# Patient Record
Sex: MALE | Race: WHITE | NOT HISPANIC OR LATINO | Employment: FULL TIME | URBAN - METROPOLITAN AREA
[De-identification: names, ages, dates, MRNs, and addresses within clinical notes are randomized per-mention and may not be internally consistent; named-entity substitution may affect disease eponyms.]

---

## 2019-11-14 ENCOUNTER — TELEPHONE (OUTPATIENT)
Dept: GASTROENTEROLOGY | Facility: HOSPITAL | Age: 44
End: 2019-11-14

## 2019-11-14 RX ORDER — POLYETHYLENE GLYCOL 3350 17 G
2 POWDER IN PACKET (EA) ORAL AS NEEDED
COMMUNITY

## 2019-11-15 ENCOUNTER — ANESTHESIA EVENT (OUTPATIENT)
Dept: GASTROENTEROLOGY | Facility: HOSPITAL | Age: 44
End: 2019-11-15

## 2019-11-15 ENCOUNTER — TELEPHONE (OUTPATIENT)
Dept: GASTROENTEROLOGY | Facility: HOSPITAL | Age: 44
End: 2019-11-15

## 2019-11-15 RX ORDER — SODIUM CHLORIDE 9 MG/ML
125 INJECTION, SOLUTION INTRAVENOUS CONTINUOUS
Status: CANCELLED | OUTPATIENT
Start: 2019-11-15

## 2019-11-18 ENCOUNTER — HOSPITAL ENCOUNTER (OUTPATIENT)
Dept: GASTROENTEROLOGY | Facility: HOSPITAL | Age: 44
Setting detail: OUTPATIENT SURGERY
Discharge: HOME | End: 2019-11-18
Attending: COLON & RECTAL SURGERY | Admitting: COLON & RECTAL SURGERY
Payer: COMMERCIAL

## 2019-11-18 ENCOUNTER — ANESTHESIA (OUTPATIENT)
Dept: GASTROENTEROLOGY | Facility: HOSPITAL | Age: 44
End: 2019-11-18

## 2019-11-18 VITALS
WEIGHT: 165 LBS | HEART RATE: 78 BPM | DIASTOLIC BLOOD PRESSURE: 85 MMHG | RESPIRATION RATE: 16 BRPM | HEIGHT: 69 IN | SYSTOLIC BLOOD PRESSURE: 132 MMHG | OXYGEN SATURATION: 99 % | TEMPERATURE: 97.1 F | BODY MASS INDEX: 24.44 KG/M2

## 2019-11-18 DIAGNOSIS — Z86.010 PERSONAL HISTORY OF COLONIC POLYPS: ICD-10-CM

## 2019-11-18 DIAGNOSIS — Z12.11 ENCOUNTER FOR SCREENING FOR MALIGNANT NEOPLASM OF COLON: ICD-10-CM

## 2019-11-18 PROCEDURE — 88305 TISSUE EXAM BY PATHOLOGIST: CPT | Performed by: PATHOLOGY

## 2019-11-18 PROCEDURE — 88341 IMHCHEM/IMCYTCHM EA ADD ANTB: CPT | Performed by: PATHOLOGY

## 2019-11-18 PROCEDURE — 88342 IMHCHEM/IMCYTCHM 1ST ANTB: CPT | Performed by: PATHOLOGY

## 2019-11-18 RX ORDER — PROPOFOL 10 MG/ML
INJECTION, EMULSION INTRAVENOUS AS NEEDED
Status: DISCONTINUED | OUTPATIENT
Start: 2019-11-18 | End: 2019-11-18 | Stop reason: SURG

## 2019-11-18 RX ORDER — SODIUM CHLORIDE 9 MG/ML
125 INJECTION, SOLUTION INTRAVENOUS CONTINUOUS
Status: DISCONTINUED | OUTPATIENT
Start: 2019-11-18 | End: 2019-11-22 | Stop reason: HOSPADM

## 2019-11-18 RX ADMIN — PROPOFOL 50 MG: 10 INJECTION, EMULSION INTRAVENOUS at 11:14

## 2019-11-18 RX ADMIN — PROPOFOL 20 MG: 10 INJECTION, EMULSION INTRAVENOUS at 11:10

## 2019-11-18 RX ADMIN — PROPOFOL 20 MG: 10 INJECTION, EMULSION INTRAVENOUS at 11:08

## 2019-11-18 RX ADMIN — SODIUM CHLORIDE 125 ML/HR: 0.9 INJECTION, SOLUTION INTRAVENOUS at 10:36

## 2019-11-18 RX ADMIN — PROPOFOL 50 MG: 10 INJECTION, EMULSION INTRAVENOUS at 11:12

## 2019-11-18 RX ADMIN — PROPOFOL 130 MG: 10 INJECTION, EMULSION INTRAVENOUS at 11:06

## 2019-11-18 RX ADMIN — PROPOFOL 50 MG: 10 INJECTION, EMULSION INTRAVENOUS at 11:17

## 2019-11-18 RX ADMIN — PROPOFOL 30 MG: 10 INJECTION, EMULSION INTRAVENOUS at 11:09

## 2019-11-18 RX ADMIN — LIDOCAINE HYDROCHLORIDE 50 MG: 20 INJECTION, SOLUTION INTRAVENOUS at 11:06

## 2019-11-18 RX ADMIN — PROPOFOL 50 MG: 10 INJECTION, EMULSION INTRAVENOUS at 11:16

## 2019-11-18 NOTE — INTERVAL H&P NOTE
H&P reviewed  After examining the patient I find no changes in the patients condition since the H&P had been written      Vitals:    11/18/19 1024   BP: 102/66   Pulse: 62   Resp: 16   Temp: (!) 97 1 °F (36 2 °C)   SpO2: 98%

## 2019-11-18 NOTE — ANESTHESIA PREPROCEDURE EVALUATION
Review of Systems/Medical History  Patient summary reviewed  Chart reviewed  No history of anesthetic complications     Cardiovascular  Negative cardio ROS    Pulmonary  Smoker cigarette smoker  ,        GI/Hepatic  Negative GI/hepatic ROS   Bowel prep  Comment: HCP     Negative  ROS        Endo/Other  Negative endo/other ROS      GYN  Negative gynecology ROS          Hematology  Negative hematology ROS      Musculoskeletal  Negative musculoskeletal ROS        Neurology  Negative neurology ROS      Psychology   Negative psychology ROS              Physical Exam    Airway    Mallampati score: II  TM Distance: >3 FB  Neck ROM: full     Dental   No notable dental hx     Cardiovascular  Comment: Negative ROS, Rhythm: regular, Rate: normal, Cardiovascular exam normal    Pulmonary  Pulmonary exam normal Breath sounds clear to auscultation,     Other Findings        Anesthesia Plan  ASA Score- 2     Anesthesia Type- IV sedation with anesthesia with ASA Monitors  Additional Monitors:   Airway Plan:         Plan Factors-Patient not instructed to abstain from smoking on day of procedure  Patient did not smoke on day of surgery  Induction- intravenous  Postoperative Plan-     Informed Consent- Anesthetic plan and risks discussed with patient  I personally reviewed this patient with the CRNA  Discussed and agreed on the Anesthesia Plan with the CRNA  Terrance Schroeder

## 2019-11-18 NOTE — DISCHARGE INSTRUCTIONS
[facility] [address], [facility]  [address]  Phone: [phone]    DISCHARGE INSTRUCTIONS:    1   __X_ Complete Exam FIBROUS BAND IN RECTUM REMOVED    2   ___ Exam normal, but entire colon not seen  We will discuss this with you  3   ___ Polyp(s) removed by "burning" - NO pathology report will follow    4   ___ Polyp(s) removed by excision  Pathology report will be available in 4-5 days   Someone from our office will call you with results  5   ___ Exam prompted biopsies  Pathology report will be available in 4-5 days   Someone from our office will call you with results  6   ___ Exam demonstrated findings that need treatment  Prescriptions will be   Given to you  Return to our office in ____ weeks  Please call for appt  7   ___ Original office visit or colonoscopy findings necessitate an office visit  Please call to set up a new appointment    8   ___ Medication  __________________________________________        [address]:    - Go straight home and rest today    - No driving or drinking alcohol for 24 hours    - Resume regular diet and medications unless otherwise instructed  Coumadin and Plavix are blood thinners  You can resume these medications on __________  IF YOU ARE HAVING ANY FEVER, BLEEDING OR PERSISTENT PAIN IN THE ABDOMEN, PLEASE CALL OUR OFFICE ANY DAY OR TIME  [phone]    Colonoscopy   WHAT YOU NEED TO KNOW:   A colonoscopy is a procedure to examine the inside of your colon (intestine) with a scope  Polyps or tissue growths may have been removed during your colonoscopy  It is normal to feel bloated and to have some abdominal discomfort  You should be passing gas  If you have hemorrhoids or you had polyps removed, you may have a small amount of bleeding  DISCHARGE INSTRUCTIONS:   Seek care immediately if:   · You have a large amount of bright red blood in your bowel movements      · Your abdomen is hard and firm and you have severe pain  · You have sudden trouble breathing  Contact your healthcare provider if:   · You develop a rash or hives  · You have a fever within 24 hours of your procedure  · You have not had a bowel movement for 3 days after your procedure  · You have questions or concerns about your condition or care  Activity:   · Do not lift, strain, or run  for 3 days after your procedure  · Rest after your procedure  You have been given medicine to relax you  Do not  drive or make important decisions until the day after your procedure  Return to your normal activity as directed  · Relieve gas and discomfort from bloating  by lying on your right side with a heating pad on your abdomen  You may need to take short walks to help the gas move out  Eat small meals until bloating is relieved  If you had polyps removed: For 7 days after your procedure:  · Do not  take aspirin  · Do not  go on long car rides  Help prevent constipation:   · Eat a variety of healthy foods  Healthy foods include fruit, vegetables, whole-grain breads, low-fat dairy products, beans, lean meat, and fish  Ask if you need to be on a special diet  Your healthcare provider may recommend that you eat high-fiber foods such as cooked beans  Fiber helps you have regular bowel movements  · Drink liquids as directed  Adults should drink between 9 and 13 eight-ounce cups of liquid every day  Ask what amount is best for you  For most people, good liquids to drink are water, juice, and milk  · Exercise as directed  Talk to your healthcare provider about the best exercise plan for you  Exercise can help prevent constipation, decrease your blood pressure and improve your health  Follow up with your healthcare provider as directed:  Write down your questions so you remember to ask them during your visits     © 2017 [employer] Information is for End User's use only and may not be sold, redistributed or otherwise used for commercial purposes  All illustrations and images included in CareNotes® are the copyrighted property of A D A M , Inc  or Ra Ruano  The above information is an  only  It is not intended as medical advice for individual conditions or treatments  Talk to your doctor, nurse or pharmacist before following any medical regimen to see if it is safe and effective for you

## 2020-12-19 ENCOUNTER — NURSE TRIAGE (OUTPATIENT)
Dept: OTHER | Facility: OTHER | Age: 45
End: 2020-12-19

## 2021-03-10 DIAGNOSIS — Z23 ENCOUNTER FOR IMMUNIZATION: ICD-10-CM

## 2023-11-20 ENCOUNTER — EVALUATION (OUTPATIENT)
Dept: PHYSICAL THERAPY | Facility: CLINIC | Age: 48
End: 2023-11-20
Payer: COMMERCIAL

## 2023-11-20 DIAGNOSIS — S46.012D TRAUMATIC COMPLETE TEAR OF LEFT ROTATOR CUFF, SUBSEQUENT ENCOUNTER: Primary | ICD-10-CM

## 2023-11-20 DIAGNOSIS — Z98.890 S/P LEFT ROTATOR CUFF REPAIR: ICD-10-CM

## 2023-11-20 DIAGNOSIS — S46.112D RUPTURE OF LEFT LONG HEAD BICEPS TENDON, SUBSEQUENT ENCOUNTER: ICD-10-CM

## 2023-11-20 PROCEDURE — 97161 PT EVAL LOW COMPLEX 20 MIN: CPT | Performed by: PHYSICAL THERAPIST

## 2023-11-20 NOTE — LETTER
2023    Nate Cartwright MD  [address] [place] [address]    Patient: Chip Mays   YOB: 1975   Date of Visit: 2023     Encounter Diagnosis     ICD-10-CM    1. Traumatic complete tear of left rotator cuff, subsequent encounter  S46.012D       2. S/P left rotator cuff repair  Z98.890       3. Rupture of left long head biceps tendon, subsequent encounter  S46.112D           Dear Dr. Roberto White:    Thank you for your recent referral of Chip Mays. Please review the attached evaluation summary from Tanya's recent visit. Please verify that you agree with the plan of care by signing the attached order. If you have any questions or concerns, please do not hesitate to call. I sincerely appreciate the opportunity to share in the care of one of your patients and hope to have another opportunity to work with you in the near future. Sincerely,    Gerber Munoz, PT      Referring Provider:      I certify that I have read the below Plan of Care and certify the need for these services furnished under this plan of treatment while under my care. Nate Cartwright MD  [id number]  Via Fax: [phone]            PT Evaluation     Today's date: 2023  Patient name: Chip Mays  : 1975  MRN: [id number]  Referring provider: Nate Cartwright*  Dx:   Encounter Diagnosis     ICD-10-CM    1. Traumatic complete tear of left rotator cuff, subsequent encounter  S46.012D       2. S/P left rotator cuff repair  Z98.890       3. Rupture of left long head biceps tendon, subsequent encounter  S46.112D                      Assessment  Assessment details: Patient is a 46 y.o. male who  presents with pain, range of motion loss, weakness s/p (L)  rotator cuff repair, biceps tenodesis,  distal clavicle excision and subacromial decompression 23.   He has functional limitations as a result of impairments. Patient would benefit from course of skilled physical therapy to address above listed impairments in an effort to improve function. Understanding of Dx/Px/POC: excellent  Goals  ST : Decrease L shoulder pain to 2/10 at worst x 1 continuous week within 6 weeks         PROM: Improve L shoulder PROM to 120 degrees for flexion/scaption, ER to 45 degrees, IR to 45 degrees within 6 weeks. AROM Improve L shoulder AROM flexion to 90 degrees in 6-8 weeks. Pt to begin submaximal shoulder isometrics by 6 weeks post op. Improved FOTO self rated functional scale score (25@ IE), pt to note greater ease with dressing, return to driving within 6 weeks. LT : Eliminate L shoulder pain x 1 continuous week within 12 weeks. PROM: Improve L shoulder PROM to 170 degrees for flexion/scaption, ER to 90 degrees, IR to 75degrees within 12 weeks. AROM :  Improve L shoulder AROM to 170 degrees for flexion/scaption, ER to 90 degrees, IR to T7 level within 16 weeks. Strength: 5/5 L shoulder stength within 16 weeks -20 weeks. Function: FOTO score to be at least 67 within 16 weeks. Independent with home exercise program within 16-20 weeks. Plan  Patient would benefit from: skilled PT  Planned modality interventions: cryotherapy, TENS, thermotherapy: hydrocollator packs and ultrasound  Planned therapy interventions: ADL training, body mechanics training, functional ROM exercises, home exercise program, joint mobilization, manual therapy, neuromuscular re-education, patient education, postural training, strengthening, stretching, therapeutic activities and therapeutic exercise  Frequency: 2-3 x's per week x 16 weeks.   Treatment plan discussed with: patient      Subjective Evaluation    History of Present Illness  Mechanism of injury: Patient is a 46 y.o. old male who presents for an initial outpatient physical therapy consultation regarding his L shoulder Darylene Pipe [facility] significant for (R) shoulder rotator cuff repair about 6 years ago. Around August or 2023 noticed some L shoulder pain putting brakes on car, then a few weeks later actuallly felt a pop in shoulder trying to get filter canister loose for water filtration system. Subsequent MRI (+) for rotator cuff tear. Underwent a L shoulder arthroscopic rotator cuff repair, long head biceps tenodesis, distal calvicle excision, subacomrial eompression on 23. Current c/o pain, inability to use L arm for ADLs, inability to work, inability to drive. 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 Mild shoulder edema    L shoulder PROM: Patient is able to achieve pendulum position  PROM FF: 90 degrees  PROM scaption: 90 degrees  PROM ER @ 45 abd 20 degrees  PROM IR: chest    AROM/Strength testing of L  shoulder NP secondary to recency of surgery. L Elbow PROM: 0-140 degrees    L Wrist AROM:WFL    UE sensation intact to light touch. Precautions: Follow rotator cuff protocol attached to chart.       Manuals 23            PROM L shoulder (as tolerated) RG                                                   Neuro Re-Ed                                                                                                        Ther Ex             Pendulums 10cw/ccw x 3            Self PROM L elbow flexion x30            Shrugs with scap squeeze X 20 Ther Activity                                       Gait Training                                       Modalities             Cryo L shoulder 10 min            IE/HEP

## 2023-11-20 NOTE — LETTER
2023      No Recipients    Patient: Shani Parikh   YOB: 1975   Date of Visit: 2023     Encounter Diagnosis     ICD-10-CM    1. Traumatic complete tear of left rotator cuff, subsequent encounter  S46.012D       2. S/P left rotator cuff repair  Z98.890       3. Rupture of left long head biceps tendon, subsequent encounter  S46.112D           Dear Dr. Odessa Rivera:    Thank you for your recent referral of Shani Parikh. Please review the attached evaluation summary from Tanya's recent visit. Please verify that you agree with the plan of care by signing the attached order. If you have any questions or concerns, please do not hesitate to call. I sincerely appreciate the opportunity to share in the care of one of your patients and hope to have another opportunity to work with you in the near future. Sincerely,    Shanice Anthony, PT      Referring Provider:      I certify that I have read the below Plan of Care and certify the need for these services furnished under this plan of treatment while under my care. Kasey Angel MD  [id number]  Via Fax: [phone]            PT Evaluation     Today's date: 2023  Patient name: Shani Parikh  : 1975  MRN: [id number]  Referring provider: Kasey Angel*  Dx:   Encounter Diagnosis     ICD-10-CM    1. Traumatic complete tear of left rotator cuff, subsequent encounter  S46.012D       2. S/P left rotator cuff repair  Z98.890       3. Rupture of left long head biceps tendon, subsequent encounter  S46.112D                      Assessment  Assessment details: Patient is a 46 y.o. male who  presents with pain, range of motion loss, weakness s/p (L)  rotator cuff repair, biceps tenodesis,  distal clavicle excision and subacromial decompression 23. He has functional limitations as a result of impairments.  Patient would benefit from course of skilled physical therapy to address above listed impairments in an effort to improve function. Understanding of Dx/Px/POC: excellent  Goals  ST : Decrease L shoulder pain to 2/10 at worst x 1 continuous week within 6 weeks         PROM: Improve L shoulder PROM to 120 degrees for flexion/scaption, ER to 45 degrees, IR to 45 degrees within 6 weeks. AROM Improve L shoulder AROM flexion to 90 degrees in 6-8 weeks. Pt to begin submaximal shoulder isometrics by 6 weeks post op. Improved FOTO self rated functional scale score (25@ IE), pt to note greater ease with dressing, return to driving within 6 weeks. LT : Eliminate L shoulder pain x 1 continuous week within 12 weeks. PROM: Improve L shoulder PROM to 170 degrees for flexion/scaption, ER to 90 degrees, IR to 75degrees within 12 weeks. AROM :  Improve L shoulder AROM to 170 degrees for flexion/scaption, ER to 90 degrees, IR to T7 level within 16 weeks. Strength: 5/5 L shoulder stength within 16 weeks -20 weeks. Function: FOTO score to be at least 67 within 16 weeks. Independent with home exercise program within 16-20 weeks. Plan  Patient would benefit from: skilled PT  Planned modality interventions: cryotherapy, TENS, thermotherapy: hydrocollator packs and ultrasound  Planned therapy interventions: ADL training, body mechanics training, functional ROM exercises, home exercise program, joint mobilization, manual therapy, neuromuscular re-education, patient education, postural training, strengthening, stretching, therapeutic activities and therapeutic exercise  Frequency: 2-3 x's per week x 16 weeks. Treatment plan discussed with: patient      Subjective Evaluation    History of Present Illness  Mechanism of injury: Patient is a 46 y.o. old male who presents for an initial outpatient physical therapy consultation regarding his L shoulder   .   [facility] significant for (R) shoulder rotator cuff repair about 6 years ago. Around August or 2023 noticed some L shoulder pain putting brakes on car, then a few weeks later actuallly felt a pop in shoulder trying to get filter canister loose for water filtration system. Subsequent MRI (+) for rotator cuff tear. Underwent a L shoulder arthroscopic rotator cuff repair, long head biceps tenodesis, distal calvicle excision, subacomrial eompression on 23. Current c/o pain, inability to use L arm for ADLs, inability to work, inability to drive. 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 Mild shoulder edema    L shoulder PROM: Patient is able to achieve pendulum position  PROM FF: 90 degrees  PROM scaption: 90 degrees  PROM ER @ 45 abd 20 degrees  PROM IR: chest    AROM/Strength testing of L  shoulder NP secondary to recency of surgery. L Elbow PROM: 0-140 degrees    L Wrist AROM:WFL    UE sensation intact to light touch. Precautions: Follow rotator cuff protocol attached to chart.       Manuals                                                                 Neuro Re-Ed                                                                                                        Ther Ex                                                                                                                     Ther Activity                                       Gait Training                                       Modalities {There is no content from the last Daily Treatment Diary section. }

## 2023-11-20 NOTE — PROGRESS NOTES
PT Evaluation     Today's date: 2023  Patient name: Ish Padilla  : 1975  MRN: [id number]  Referring provider: Keyonna Miller*  Dx:   Encounter Diagnosis     ICD-10-CM    1. Traumatic complete tear of left rotator cuff, subsequent encounter  S46.012D       2. S/P left rotator cuff repair  Z98.890       3. Rupture of left long head biceps tendon, subsequent encounter  S46.112D                      Assessment  Assessment details: Patient is a 46 y.o. male who  presents with pain, range of motion loss, weakness s/p (L)  rotator cuff repair, biceps tenodesis,  distal clavicle excision and subacromial decompression 23. He has functional limitations as a result of impairments. Patient would benefit from course of skilled physical therapy to address above listed impairments in an effort to improve function. Understanding of Dx/Px/POC: excellent  Goals  ST : Decrease L shoulder pain to 2/10 at worst x 1 continuous week within 6 weeks         PROM: Improve L shoulder PROM to 120 degrees for flexion/scaption, ER to 45 degrees, IR to 45 degrees within 6 weeks. AROM Improve L shoulder AROM flexion to 90 degrees in 6-8 weeks. Pt to begin submaximal shoulder isometrics by 6 weeks post op. Improved FOTO self rated functional scale score (25@ IE), pt to note greater ease with dressing, return to driving within 6 weeks. LT : Eliminate L shoulder pain x 1 continuous week within 12 weeks. PROM: Improve L shoulder PROM to 170 degrees for flexion/scaption, ER to 90 degrees, IR to 75degrees within 12 weeks. AROM :  Improve L shoulder AROM to 170 degrees for flexion/scaption, ER to 90 degrees, IR to T7 level within 16 weeks. Strength: 5/5 L shoulder stength within 16 weeks -20 weeks. Function: FOTO score to be at least 67 within 16 weeks. Independent with home exercise program within 16-20 weeks.       Plan  Patient would benefit from: skilled PT  Planned modality interventions: cryotherapy, TENS, thermotherapy: hydrocollator packs and ultrasound  Planned therapy interventions: ADL training, body mechanics training, functional ROM exercises, home exercise program, joint mobilization, manual therapy, neuromuscular re-education, patient education, postural training, strengthening, stretching, therapeutic activities and therapeutic exercise  Frequency: 2-3 x's per week x 16 weeks. Treatment plan discussed with: patient      Subjective Evaluation    History of Present Illness  Mechanism of injury: Patient is a 46 y.o. old male who presents for an initial outpatient physical therapy consultation regarding his L shoulder   . PM[facility] significant for (R) shoulder rotator cuff repair about 6 years ago. Around August or 2023 noticed some L shoulder pain putting brakes on car, then a few weeks later actuallly felt a pop in shoulder trying to get filter canister loose for water filtration system. Subsequent MRI (+) for rotator cuff tear. Underwent a L shoulder arthroscopic rotator cuff repair, long head biceps tenodesis, distal calvicle excision, subacomrial eompression on 23. Current c/o pain, inability to use L arm for ADLs, inability to work, inability to drive. 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    Mild shoulder edema    L shoulder PROM: Patient is able to achieve pendulum position  PROM FF: 90 degrees  PROM scaption: 90 degrees  PROM ER @ 45 abd 20 degrees  PROM IR: chest    AROM/Strength testing of L  shoulder NP secondary to recency of surgery. L Elbow PROM: 0-140 degrees    L Wrist AROM:WFL    UE sensation intact to light touch. Precautions: Follow rotator cuff protocol attached to chart.       Manuals 11/20/23            PROM L shoulder (as tolerated) RG                                                   Neuro Re-Ed                                                                                                        Ther Ex             Pendulums 10cw/ccw x 3            Self PROM L elbow flexion x30            Shrugs with scap squeeze X 20                                                                             Ther Activity                                       Gait Training                                       Modalities             Cryo L shoulder 10 min            IE/HEP

## 2023-11-24 ENCOUNTER — OFFICE VISIT (OUTPATIENT)
Dept: PHYSICAL THERAPY | Facility: CLINIC | Age: 48
End: 2023-11-24
Payer: COMMERCIAL

## 2023-11-24 DIAGNOSIS — S46.012D TRAUMATIC COMPLETE TEAR OF LEFT ROTATOR CUFF, SUBSEQUENT ENCOUNTER: Primary | ICD-10-CM

## 2023-11-24 DIAGNOSIS — Z98.890 S/P LEFT ROTATOR CUFF REPAIR: ICD-10-CM

## 2023-11-24 DIAGNOSIS — S46.112D RUPTURE OF LEFT LONG HEAD BICEPS TENDON, SUBSEQUENT ENCOUNTER: ICD-10-CM

## 2023-11-24 PROCEDURE — 97110 THERAPEUTIC EXERCISES: CPT

## 2023-11-24 NOTE — PROGRESS NOTES
Daily Note     Today's date: 2023  Patient name: Mariama Martinez  : 1975  MRN: [id number]  Referring provider: Cordell Holt*  Dx:   Encounter Diagnosis     ICD-10-CM    1. Traumatic complete tear of left rotator cuff, subsequent encounter  S46.012D       2. S/P left rotator cuff repair  Z98.890       3. Rupture of left long head biceps tendon, subsequent encounter  S46.112D           Start Time: 09  Stop Time: 1000  Total time in clinic (min): 40 minutes    Subjective: Pt reports he has been doing well. Sleeping has been the toughest part of his recovery so far, and trying to find a comfortable position. Has been compliant with performance of HEP and utilizing primarily ice to manage any pain/soreness. Recently saw his orthopedic surgeon who was pleased with his progress thus far. Objective: See treatment diary below      Assessment: Tolerated treatment well. Initiated POC as outlined below focusing primarily on mobility and ROM of R shldr.  Most restricted and most discomfort noted during PROM into ER. Patient would benefit from continued PT to further improve strength, increase ROM, and maximize overall function, per MD protocol. Plan: Continue per plan of care. Monitor response to initial treatment NV. Precautions: Follow rotator cuff protocol attached to chart.       Manuals 23           PROM L shoulder (as tolerated) RG [employer]                                                  Neuro Re-Ed                                                                                                        Ther Ex             Pendulums 10cw/ccw x 3 10cw/ccw x 3           Self PROM L elbow flexion x30 x30           Shrugs with scap squeeze X 20 x20                                                                            Ther Activity                                       Gait Training                                       Modalities             Cryo L shoulder 10 min Deferred to home           IE/HEP

## 2023-11-28 ENCOUNTER — OFFICE VISIT (OUTPATIENT)
Dept: PHYSICAL THERAPY | Facility: CLINIC | Age: 48
End: 2023-11-28
Payer: COMMERCIAL

## 2023-11-28 DIAGNOSIS — S46.012D TRAUMATIC COMPLETE TEAR OF LEFT ROTATOR CUFF, SUBSEQUENT ENCOUNTER: Primary | ICD-10-CM

## 2023-11-28 DIAGNOSIS — S46.112D RUPTURE OF LEFT LONG HEAD BICEPS TENDON, SUBSEQUENT ENCOUNTER: ICD-10-CM

## 2023-11-28 DIAGNOSIS — Z98.890 S/P LEFT ROTATOR CUFF REPAIR: ICD-10-CM

## 2023-11-28 PROCEDURE — 97140 MANUAL THERAPY 1/> REGIONS: CPT | Performed by: PHYSICAL THERAPIST

## 2023-11-28 NOTE — PROGRESS NOTES
Daily Note     Today's date: 2023  Patient name: Tony Quevedo  : 1975  MRN: [id number]  Referring provider: Stephie Watt*  Dx:   Encounter Diagnosis     ICD-10-CM    1. Traumatic complete tear of left rotator cuff, subsequent encounter  S46.012D       2. Rupture of left long head biceps tendon, subsequent encounter  S46.112D       3. S/P left rotator cuff repair  Z98.890                      Subjective: Has dull anoying pain throughout the thsay about 3/10 on average. Sleeping in recliner with other pillows supporting him. Stll is still not good, getting afew hours at a time. No use of pain medication. Objective: See treatment diary below  PROM L shoulder  FF: 100 degrees with pain  Scaption:110 degrees with pain  ER: 40 degrees with pain  IR: 35 degrees with pain    Assessment:  Brice Bah is now 1 week, 1 day post op. PROM coming along appropriately. Plan: Continue per plan of care. Precautions: Follow rotator cuff protocol attached to chart.       Manuals 23          PROM L shoulder (as tolerated) RG AFB RG                                                 Neuro Re-Ed                                                                                                        Ther Ex             Pendulums 10cw/ccw x 3 10cw/ccw x 3 10cw/ccw x 3          Self PROM L elbow flexion x30 x30 X 30          Shrugs with scap squeeze X 20 x20 X 30                                                                           Ther Activity                                       Gait Training                                       Modalities             Cryo L shoulder 10 min Deferred to home 10 min          IE/HEP

## 2023-11-30 ENCOUNTER — OFFICE VISIT (OUTPATIENT)
Dept: PHYSICAL THERAPY | Facility: CLINIC | Age: 48
End: 2023-11-30
Payer: COMMERCIAL

## 2023-11-30 DIAGNOSIS — Z98.890 S/P LEFT ROTATOR CUFF REPAIR: ICD-10-CM

## 2023-11-30 DIAGNOSIS — S46.012D TRAUMATIC COMPLETE TEAR OF LEFT ROTATOR CUFF, SUBSEQUENT ENCOUNTER: Primary | ICD-10-CM

## 2023-11-30 PROCEDURE — 97140 MANUAL THERAPY 1/> REGIONS: CPT | Performed by: PHYSICAL THERAPIST

## 2023-11-30 NOTE — PROGRESS NOTES
Daily Note     Today's date: 2023  Patient name: Jhon Alvarado  : 1975  MRN: [id number]  Referring provider: Shannan Baumgarten*  Dx:   Encounter Diagnosis     ICD-10-CM    1. Traumatic complete tear of left rotator cuff, subsequent encounter  S46.012D       2. S/P left rotator cuff repair  Z98.890                      Subjective: Waking up around 4 am consistently. Sling uncomfortable. No use of Rx of OTC pain medication for the past 48 hours. HEP and donning/doffing sling easier. Objective: See treatment diary below  PROM L shoulder  FF: 120 degrees with pain  Scaption:140 degrees with pain  ER: 50 degrees with pain  IR: 45 degrees with pain    Assessment: PROM coming along well/appropriately now two weeks, 3 days postop. Patient would benefit from continued course of skilled physical therapy to address impairments in an effort to improve function. Plan: Continue per plan of care. Precautions: Follow rotator cuff protocol attached to chart.       Manuals 23         PROM L shoulder (as tolerated) RG AFB RG RG                                                Neuro Re-Ed                                                                                                        Ther Ex             Pendulums 10cw/ccw x 3 10cw/ccw x 3 10cw/ccw x 3 10cw/ccw x 3         Self PROM L elbow flexion x30 x30 X 30 X 30         Shrugs with scap squeeze X 20 x20 X 30 X 30                                                                          Ther Activity                                       Gait Training                                       Modalities             Cryo L shoulder 10 min Deferred to home 10 min 10 min         IE/HEP

## 2023-12-05 ENCOUNTER — OFFICE VISIT (OUTPATIENT)
Dept: PHYSICAL THERAPY | Facility: CLINIC | Age: 48
End: 2023-12-05
Payer: COMMERCIAL

## 2023-12-05 DIAGNOSIS — S46.012D TRAUMATIC COMPLETE TEAR OF LEFT ROTATOR CUFF, SUBSEQUENT ENCOUNTER: Primary | ICD-10-CM

## 2023-12-05 DIAGNOSIS — Z98.890 S/P LEFT ROTATOR CUFF REPAIR: ICD-10-CM

## 2023-12-05 PROCEDURE — 97140 MANUAL THERAPY 1/> REGIONS: CPT | Performed by: PHYSICAL THERAPIST

## 2023-12-05 NOTE — PROGRESS NOTES
Daily Note     Today's date: 2023  Patient name: Cordell Gilman  : 1975  MRN: [id number]  Referring provider: Emilio Rothman*  Dx:   Encounter Diagnosis     ICD-10-CM    1. Traumatic complete tear of left rotator cuff, subsequent encounter  S46.012D       2. S/P left rotator cuff repair  Z98.890                        Subjective: A little better each day. Compliant with sling use. Objective: See treatment diary below  PROM L shoulder  FF: 115 degrees with pain, some crepittaion  Scaption:120 degrees with pain  ER: 50 degrees with pain  IR: 50 degrees with pain    Assessment:  3 weeks, 1 day post up. No acute concerns. PROM still coming along appropriately. He is in a holding pattern with exercise until 4 weeks post op radha. Patient would benefit from continued course of skilled physical therapy to address impairments in an effort to improve function. Plan: Continue per plan of care. Precautions: Follow rotator cuff protocol attached to chart.       Manuals 23        PROM L shoulder (as tolerated) RG AFB RG RG RG                                               Neuro Re-Ed                                                                                                        Ther Ex             Pendulums 10cw/ccw x 3 10cw/ccw x 3 10cw/ccw x 3 10cw/ccw x 3 0cw/ccw x 3        Self PROM L elbow flexion x30 x30 X 30 X 30 X 30        Shrugs with scap squeeze X 20 x20 X 30 X 30 X 30                                                                         Ther Activity                                       Gait Training                                       Modalities             Cryo L shoulder 10 min Deferred to home 10 min 10 min 10 min        IE/HEP
10-Dec-2018 05:04

## 2023-12-08 ENCOUNTER — OFFICE VISIT (OUTPATIENT)
Dept: PHYSICAL THERAPY | Facility: CLINIC | Age: 48
End: 2023-12-08
Payer: COMMERCIAL

## 2023-12-08 DIAGNOSIS — S46.112D RUPTURE OF LEFT LONG HEAD BICEPS TENDON, SUBSEQUENT ENCOUNTER: ICD-10-CM

## 2023-12-08 DIAGNOSIS — Z98.890 S/P LEFT ROTATOR CUFF REPAIR: ICD-10-CM

## 2023-12-08 DIAGNOSIS — S46.012D TRAUMATIC COMPLETE TEAR OF LEFT ROTATOR CUFF, SUBSEQUENT ENCOUNTER: Primary | ICD-10-CM

## 2023-12-08 PROCEDURE — 97140 MANUAL THERAPY 1/> REGIONS: CPT

## 2023-12-08 NOTE — PROGRESS NOTES
Daily Note     Today's date: 2023  Patient name: Duncan Will  : 1975  MRN: [id number]  Referring provider: Jessica Mattson*  Dx:   Encounter Diagnosis     ICD-10-CM    1. Traumatic complete tear of left rotator cuff, subsequent encounter  S46.012D       2. S/P left rotator cuff repair  Z98.890       3. Rupture of left long head biceps tendon, subsequent encounter  S46.112D                        Subjective: Patient is now sleeping better. Compliant with his precautions and use of sling. Objective: See treatment diary below. Assessment:  3 weeks, 4 days post-op. Unable to progress further at this point given timeframe. Improving ROM despite pain. Will continue to follow post-op protocol and progress when appropriate. Plan: Continue per plan of care. Precautions: Follow rotator cuff protocol attached to chart.     DOS: 2023    Manuals 23       PROM L shoulder   (as tolerated) RG AFB RG RG RG RG & EH                                              Neuro Re-Ed                                                                                                        Ther Ex             Pendulums 10cw/ccw x 3 10cw/ccw x 3 10cw/ccw x 3 10cw/ccw x 3 0cw/ccw x 3 3x10  ea       Self PROM L elbow flexion x30 x30 X 30 X 30 X 30 x30       Shrugs with scap squeeze X 20 x20 X 30 X 30 X 30 x30                                                                        Ther Activity                                       Gait Training                                       Modalities             Cryo L shoulder 10 min Deferred to home 10 min 10 min 10 min 10 min post       IE/HEP

## 2023-12-12 ENCOUNTER — OFFICE VISIT (OUTPATIENT)
Dept: PHYSICAL THERAPY | Facility: CLINIC | Age: 48
End: 2023-12-12
Payer: COMMERCIAL

## 2023-12-12 DIAGNOSIS — Z98.890 S/P LEFT ROTATOR CUFF REPAIR: ICD-10-CM

## 2023-12-12 DIAGNOSIS — S46.012D TRAUMATIC COMPLETE TEAR OF LEFT ROTATOR CUFF, SUBSEQUENT ENCOUNTER: Primary | ICD-10-CM

## 2023-12-12 DIAGNOSIS — S46.112D RUPTURE OF LEFT LONG HEAD BICEPS TENDON, SUBSEQUENT ENCOUNTER: ICD-10-CM

## 2023-12-12 PROCEDURE — 97140 MANUAL THERAPY 1/> REGIONS: CPT | Performed by: PHYSICAL THERAPIST

## 2023-12-12 PROCEDURE — 97110 THERAPEUTIC EXERCISES: CPT | Performed by: PHYSICAL THERAPIST

## 2023-12-12 NOTE — PROGRESS NOTES
Daily Note     Today's date: 2023  Patient name: Romelia Ramirez  : 1975  MRN: [id number]  Referring provider: Oliva Santos*  Dx:   Encounter Diagnosis     ICD-10-CM    1. Traumatic complete tear of left rotator cuff, subsequent encounter  S46.012D       2. S/P left rotator cuff repair  Z98.890       3. Rupture of left long head biceps tendon, subsequent encounter  S46.112D                      Subjective: Feeling stiff and sore today, has not done HEP today. Objective: See treatment diary below. Progressed into AAROM activities now at 4 week post op radha. PROM scaption improved to 145 degrees      Assessment: Making appropriate progress now 4 weeks post op. Patient would benefit from continued course of skilled physical therapy to address impairments in an effort to improve function. Plan: Continue per plan of care. Sling until 6 weeks post op (23)     Precautions: Follow rotator cuff protocol attached to chart.     DOS: 2023    Manuals 23      PROM L shoulder   (as tolerated) RG AFB RG RG RG RG & EH RG                                             Neuro Re-Ed                                                                                                        Ther Ex             Pendulums 10cw/ccw x 3 10cw/ccw x 3 10cw/ccw x 3 10cw/ccw x 3 0cw/ccw x 3 3x10  ea 10cw/ccw x 3      Self PROM L elbow flexion x30 x30 X 30 X 30 X 30 x30 X 30      Shrugs with scap squeeze X 20 x20 X 30 X 30 X 30 x30 X 30      Table slides FF (both arms on towel)       10sec x 20      Arm on table, lean forward for self PROM ER       10sec x 10      Self supine AAROM flexion       10sec x 10                                Ther Activity                                       Gait Training                                       Modalities             Cryo L shoulder 10 min Deferred to home 10 min 10 min 10 min 10 min post 10 min      IE/HEP

## 2023-12-15 ENCOUNTER — OFFICE VISIT (OUTPATIENT)
Dept: PHYSICAL THERAPY | Facility: CLINIC | Age: 48
End: 2023-12-15
Payer: COMMERCIAL

## 2023-12-15 DIAGNOSIS — S46.112D RUPTURE OF LEFT LONG HEAD BICEPS TENDON, SUBSEQUENT ENCOUNTER: ICD-10-CM

## 2023-12-15 DIAGNOSIS — S46.012D TRAUMATIC COMPLETE TEAR OF LEFT ROTATOR CUFF, SUBSEQUENT ENCOUNTER: Primary | ICD-10-CM

## 2023-12-15 DIAGNOSIS — Z98.890 S/P LEFT ROTATOR CUFF REPAIR: ICD-10-CM

## 2023-12-15 PROCEDURE — 97110 THERAPEUTIC EXERCISES: CPT | Performed by: PHYSICAL THERAPIST

## 2023-12-15 PROCEDURE — 97140 MANUAL THERAPY 1/> REGIONS: CPT | Performed by: PHYSICAL THERAPIST

## 2023-12-15 NOTE — PROGRESS NOTES
Daily Note     Today's date: 12/15/2023  Patient name: Jesus Bello  : 1975  MRN: [id number]  Referring provider: Cammie Rodriguez  Dx:   Encounter Diagnosis     ICD-10-CM    1. Traumatic complete tear of left rotator cuff, subsequent encounter  S46.012D       2. S/P left rotator cuff repair  Z98.890       3. Rupture of left long head biceps tendon, subsequent encounter  S46.112D                        Subjective: Doing HEP. Feels some pain with progressions. Current notes tightness vs pain. Objective: See treatment diary below. PROM L  shoulder:  FF: 150 degrees  Scaption:155 degrees  ER: 55 degrees  IR:50 degrees    Assessment: Best objective day yet as far as ROM concerned. Patient would benefit from continued course of skilled physical therapy to address impairments in an effort to improve function. Plan: Continue per plan of care. Sling until 6 weeks post op (23)     Precautions: Follow rotator cuff protocol attached to chart.     DOS: 2023    Manuals 11/20/23 11/24/23 11/28 11/30 12/5 12/8 12/12 12/15     PROM L shoulder   (as tolerated) RG AFB RG RG RG RG & EH RG RG                                            Neuro Re-Ed                                                                                                        Ther Ex             Pendulums 10cw/ccw x 3 10cw/ccw x 3 10cw/ccw x 3 10cw/ccw x 3 0cw/ccw x 3 3x10  ea 10cw/ccw x 3 10cw/ccw x 3     Self PROM L elbow flexion x30 x30 X 30 X 30 X 30 x30 X 30  X 30     Shrugs with scap squeeze X 20 x20 X 30 X 30 X 30 x30 X 30 X 20     Table slides FF (both arms on towel)       10sec x 20 10sec x 20     Arm on table, lean forward for self PROM ER       10sec x 10 10sec x 10     Self supine AAROM flexion       10sec x 10 10sec x 10                               Ther Activity                                       Gait Training                                       Modalities             Cryo L shoulder 10 min Deferred to home 10 min 10 min 10 min 10 min post 10 min 10 min     IE/HEP

## 2023-12-19 ENCOUNTER — OFFICE VISIT (OUTPATIENT)
Dept: PHYSICAL THERAPY | Facility: CLINIC | Age: 48
End: 2023-12-19
Payer: COMMERCIAL

## 2023-12-19 DIAGNOSIS — Z98.890 S/P LEFT ROTATOR CUFF REPAIR: ICD-10-CM

## 2023-12-19 DIAGNOSIS — S46.112D RUPTURE OF LEFT LONG HEAD BICEPS TENDON, SUBSEQUENT ENCOUNTER: ICD-10-CM

## 2023-12-19 DIAGNOSIS — S46.012D TRAUMATIC COMPLETE TEAR OF LEFT ROTATOR CUFF, SUBSEQUENT ENCOUNTER: Primary | ICD-10-CM

## 2023-12-19 PROCEDURE — 97110 THERAPEUTIC EXERCISES: CPT | Performed by: PHYSICAL THERAPIST

## 2023-12-19 PROCEDURE — 97140 MANUAL THERAPY 1/> REGIONS: CPT | Performed by: PHYSICAL THERAPIST

## 2023-12-19 NOTE — PROGRESS NOTES
Daily Note     Today's date: 2023  Patient name: Tanya Padgett  : 1975  MRN: [id number]  Referring provider: Bogdan Neville*  Dx:   Encounter Diagnosis     ICD-10-CM    1. Traumatic complete tear of left rotator cuff, subsequent encounter  S46.012D       2. S/P left rotator cuff repair  Z98.890       3. Rupture of left long head biceps tendon, subsequent encounter  S46.112D                      Subjective: Less pain, home exercises getting easier, he is sleeping better.      Objective: See treatment diary below.    PROM L  shoulder:  FF: 150 degrees  Scaption:160 degrees  ER: 60 degrees  IR:50 degrees      Assessment: Tanya is now 5weeks, 1 day postop. Capsular tightness is improving.      Plan: Continue per plan of care. Sling to be discharged at 6 weeks postop (23)     Precautions: Follow rotator cuff protocol attached to chart.    DOS: 2023    Manuals 11/20/23 11/24/23 11/28 11/30 12/5 12/8 12/12 12/15 12/19    PROM L shoulder   (as tolerated) RG AFB RG RG RG RG & EH RG RG RG                                           Neuro Re-Ed                                                                                                        Ther Ex             Pendulums 10cw/ccw x 3 10cw/ccw x 3 10cw/ccw x 3 10cw/ccw x 3 0cw/ccw x 3 3x10  ea 10cw/ccw x 3 10cw/ccw x 3 10cw/ccw x 3    Self PROM L elbow flexion x30 x30 X 30 X 30 X 30 x30 X 30  X 30 D/C    Shrugs with scap squeeze X 20 x20 X 30 X 30 X 30 x30 X 30 X 20 X 30    Table slides FF (both arms on towel)       10sec x 20 10sec x 20 10sec x 20    Arm on table, lean forward for self PROM ER       10sec x 10 10sec x 10 10sec x 10    Self supine AAROM flexion       10sec x 10 10sec x 10 10sec x 10    Supine wand AAROM L shoulder ER with towel roll under arm at 90 ABD         10sec x 10                 Ther Activity                                       Gait Training                                       Modalities             Cryo L  shoulder 10 min Deferred to home 10 min 10 min 10 min 10 min post 10 min 10 min Pt deferred    IE/HEP

## 2023-12-22 ENCOUNTER — OFFICE VISIT (OUTPATIENT)
Dept: PHYSICAL THERAPY | Facility: CLINIC | Age: 48
End: 2023-12-22
Payer: COMMERCIAL

## 2023-12-22 DIAGNOSIS — Z98.890 S/P LEFT ROTATOR CUFF REPAIR: ICD-10-CM

## 2023-12-22 DIAGNOSIS — S46.012D TRAUMATIC COMPLETE TEAR OF LEFT ROTATOR CUFF, SUBSEQUENT ENCOUNTER: Primary | ICD-10-CM

## 2023-12-22 DIAGNOSIS — S46.112D RUPTURE OF LEFT LONG HEAD BICEPS TENDON, SUBSEQUENT ENCOUNTER: ICD-10-CM

## 2023-12-22 PROCEDURE — 97140 MANUAL THERAPY 1/> REGIONS: CPT | Performed by: PHYSICAL THERAPIST

## 2023-12-22 PROCEDURE — 97110 THERAPEUTIC EXERCISES: CPT | Performed by: PHYSICAL THERAPIST

## 2023-12-22 NOTE — PROGRESS NOTES
Daily Note     Today's date: 2023  Patient name: Tanya Padgett  : 1975  MRN: [id number]  Referring provider: Bogdan Neville*  Dx:   Encounter Diagnosis     ICD-10-CM    1. Traumatic complete tear of left rotator cuff, subsequent encounter  S46.012D       2. S/P left rotator cuff repair  Z98.890       3. Rupture of left long head biceps tendon, subsequent encounter  S46.112D                      Subjective: patient offers no new complaints, he states he is sleeping better. He states that he is excited to be able to take the sling off on [place].      Objective: See treatment diary below    Assessment: Tanya is now 5 weeks, 4 day postop. He has good tolerance to PROM. He demonstrates good technique throughout the session with minimal cues required. He would benefit from continued PT to improve range of motion, strength, and functional abilities.       Plan: Continue per plan of care.  Progress treatment as tolerated.   Progress treament per protocol.      Precautions: Follow rotator cuff protocol attached to chart.    DOS: 2023    Manuals 11/20/23 11/24/23 11/28 11/30 12/5 12/8 12/12 12/15 12/19 12/22   PROM L shoulder   (as tolerated) RG AFB RG RG RG RG & EH RG RG RG SK                                          Neuro Re-Ed                                                                                                        Ther Ex             Pendulums 10cw/ccw x 3 10cw/ccw x 3 10cw/ccw x 3 10cw/ccw x 3 0cw/ccw x 3 3x10  ea 10cw/ccw x 3 10cw/ccw x 3 10cw/ccw x 3 10 cw/ccw x3   Self PROM L elbow flexion x30 x30 X 30 X 30 X 30 x30 X 30  X 30 D/C -   Shrugs with scap squeeze X 20 x20 X 30 X 30 X 30 x30 X 30 X 20 X 30 X 30   Table slides FF (both arms on towel)       10sec x 20 10sec x 20 10sec x 20 10sec x 20   Arm on table, lean forward for self PROM ER       10sec x 10 10sec x 10 10sec x 10 10sec x 20   Self supine AAROM flexion       10sec x 10 10sec x 10 10sec x 10 10sec x 20    Supine wand AAROM L shoulder ER with towel roll under arm at 90 ABD         10sec x 10 10sec x 10                Ther Activity                                       Gait Training                                       Modalities             Cryo L shoulder 10 min Deferred to home 10 min 10 min 10 min 10 min post 10 min 10 min Pt deferred 10' post   IE/HEP

## 2023-12-26 ENCOUNTER — OFFICE VISIT (OUTPATIENT)
Dept: PHYSICAL THERAPY | Facility: CLINIC | Age: 48
End: 2023-12-26
Payer: COMMERCIAL

## 2023-12-26 DIAGNOSIS — Z98.890 S/P LEFT ROTATOR CUFF REPAIR: ICD-10-CM

## 2023-12-26 DIAGNOSIS — S46.012D TRAUMATIC COMPLETE TEAR OF LEFT ROTATOR CUFF, SUBSEQUENT ENCOUNTER: Primary | ICD-10-CM

## 2023-12-26 DIAGNOSIS — S46.112D RUPTURE OF LEFT LONG HEAD BICEPS TENDON, SUBSEQUENT ENCOUNTER: ICD-10-CM

## 2023-12-26 PROCEDURE — 97110 THERAPEUTIC EXERCISES: CPT | Performed by: PHYSICAL THERAPIST

## 2023-12-26 PROCEDURE — 97140 MANUAL THERAPY 1/> REGIONS: CPT | Performed by: PHYSICAL THERAPIST

## 2023-12-26 NOTE — PROGRESS NOTES
Daily Note     Today's date: 2023  Patient name: Tanay Padgett  : 1975  MRN: [id number]  Referring provider: Bogdan Neville*  Dx:   Encounter Diagnosis     ICD-10-CM    1. Traumatic complete tear of left rotator cuff, subsequent encounter  S46.012D       2. S/P left rotator cuff repair  Z98.890       3. Rupture of left long head biceps tendon, subsequent encounter  S46.112D                        Subjective: Out of sling as of yesterday. Returned to driving today. Feels better to be out of sling, however still with soreness and stiffness in L shoulder.        Objective: See treatment diary below.  Progressed self stretching program.  Progressed periscapular strengthening program.    AROM L shoulder FF: 60 degrees      Assessment: Tanya is now 6 weeks, 1 day postop. Fatigued with progressions. Has the most tightness with rotational motions with capsular restriction noted. He would benefit from continued PT to improve range of motion, strength, and functional abilities.       Plan: Continue per plan of care.  Progress treament per protocol.      Precautions: Follow rotator cuff protocol attached to chart.    DOS: 2023    Manuals 23            PROM L shoulder   (as tolerated) RG            Manual posterior capsule stretch RG                                      Neuro Re-Ed                                                                                                        Ther Ex             UBE UBE2  2/2  L1            Pulley flexion 10sec hold x 5 min            Finger ladder 10sec x 5                         Arm on table, lean forward for self PROM ER 10sec x 10            Supine wand AAROM L shoulder ER with towel roll under arm at 90 ABD 10sec x 10            Sleeper IR/ER 30sec x 3             Tband Rows Green  3sec  2x  10            Ther Activity                                       Gait Training                                       Modalities             Cryo L  shoulder 10 min            IE/HEP

## 2023-12-29 ENCOUNTER — OFFICE VISIT (OUTPATIENT)
Dept: PHYSICAL THERAPY | Facility: CLINIC | Age: 48
End: 2023-12-29
Payer: COMMERCIAL

## 2023-12-29 DIAGNOSIS — S46.112D RUPTURE OF LEFT LONG HEAD BICEPS TENDON, SUBSEQUENT ENCOUNTER: ICD-10-CM

## 2023-12-29 DIAGNOSIS — S46.012D TRAUMATIC COMPLETE TEAR OF LEFT ROTATOR CUFF, SUBSEQUENT ENCOUNTER: Primary | ICD-10-CM

## 2023-12-29 DIAGNOSIS — Z98.890 S/P LEFT ROTATOR CUFF REPAIR: ICD-10-CM

## 2023-12-29 PROCEDURE — 97140 MANUAL THERAPY 1/> REGIONS: CPT | Performed by: PHYSICAL THERAPIST

## 2023-12-29 PROCEDURE — 97110 THERAPEUTIC EXERCISES: CPT | Performed by: PHYSICAL THERAPIST

## 2023-12-29 NOTE — PROGRESS NOTES
Daily Note     Today's date: 2023  Patient name: Tanya Padgett  : 1975  MRN: [id number]  Referring provider: Bogdan Neville*  Dx:   Encounter Diagnosis     ICD-10-CM    1. Traumatic complete tear of left rotator cuff, subsequent encounter  S46.012D       2. Rupture of left long head biceps tendon, subsequent encounter  S46.112D       3. S/P left rotator cuff repair  Z98.890                          Subjective: Has been doing HEP. Still with difficulty lifting L arm, having some sleep interruption because of pain.        Objective: See treatment diary below.     AROM L shoulder FF: 75 degrees      PROM L  shoulder:  FF: 155 degrees  Scaption:165 degrees  ER: 65 degrees  IR:55 degrees      Assessment: Making steady progress towards recovery.    Plan: Continue per plan of care.  Progress treament per protocol.      Precautions: Follow rotator cuff protocol attached to chart.    DOS: 2023    Manuals 23           PROM L shoulder   (as tolerated) RG RG           Manual posterior capsule stretch RG RG                                     Neuro Re-Ed                                                                                                        Ther Ex             UBE UBE2  2/2  L1 UBE2  3/3  L1           Pulley flexion 10sec hold x 5 min 10sec hold x 5 min           Finger ladder 10sec x 5 10sec x 5                        Arm on table, lean forward for self PROM ER 10sec x 10 10sec x 10           Supine wand AAROM L shoulder ER with towel roll under arm at 90 ABD 10sec x 10 10sec x 10           Sleeper IR/ER 30sec x 3             Tband Rows Green  3sec  2x  10 Green  3sec  2 x 10           Tband Extension  Green  3sec  2 x 10           Ther Activity                                       Gait Training                                       Modalities             Cryo L shoulder 10 min 10 min           IE/HEP

## 2024-01-02 ENCOUNTER — OFFICE VISIT (OUTPATIENT)
Dept: PHYSICAL THERAPY | Facility: CLINIC | Age: 49
End: 2024-01-02
Payer: COMMERCIAL

## 2024-01-02 DIAGNOSIS — Z98.890 S/P LEFT ROTATOR CUFF REPAIR: ICD-10-CM

## 2024-01-02 DIAGNOSIS — S46.012D TRAUMATIC COMPLETE TEAR OF LEFT ROTATOR CUFF, SUBSEQUENT ENCOUNTER: Primary | ICD-10-CM

## 2024-01-02 PROCEDURE — 97140 MANUAL THERAPY 1/> REGIONS: CPT | Performed by: PHYSICAL THERAPIST

## 2024-01-02 PROCEDURE — 97110 THERAPEUTIC EXERCISES: CPT | Performed by: PHYSICAL THERAPIST

## 2024-01-02 NOTE — PROGRESS NOTES
Progress  Note     Today's date: 2024  Patient name: Tanya Padgett  : 1975  MRN: [id number]  Referring provider: Bogdan Neville*  Dx:   Encounter Diagnosis     ICD-10-CM    1. Traumatic complete tear of left rotator cuff, subsequent encounter  S46.012D       2. S/P left rotator cuff repair  Z98.890                  Assessment  Assessment details: Patient is a 48 y.o. male who  presents with pain, range of motion loss, weakness s/p (L)  rotator cuff repair, biceps tenodesis,  distal clavicle excision and subacromial decompression 23.  Making progress 7 weeks, 1 day post op. Has moderate capsular tightness. Next phase of rehab starting work on AAROM then AROM and gentle strengthening in an effort to restore post operative function.      FOTO self rated functional score improved to 53 from 25@ IE.       Understanding of Dx/Px/POC: excellent  Goals  ST : Decrease L shoulder pain to 2/10 at worst x 1 continuous week within 6 weeks-not yet met         PROM: Improve L shoulder PROM to 120 degrees for flexion/scaption, ER to 45 degrees, IR to 45 degrees within 6 weeks.-met         AROM Improve L shoulder AROM flexion to 90 degrees in 6-8 weeks.-not yet met         Pt to begin submaximal shoulder isometrics by 6 weeks post op.-met         Improved FOTO self rated functional scale score (25@ IE), pt to note greater ease with dressing, return to driving within 6 weeks.-met     LT : Eliminate L shoulder pain x 1 continuous week within 12 weeks.-not yet met         PROM: Improve L shoulder PROM to 170 degrees for flexion/scaption, ER to 90 degrees, IR to 75degrees within 12 weeks.-not yet met         AROM :  Improve L shoulder AROM to 170 degrees for flexion/scaption, ER to 90 degrees, IR to T7 level within 16 weeks. -not yet met         Strength: 5/5 L shoulder stength within 16 weeks -20 weeks.-not yet met         Function: FOTO score to be at least 67 within 16 weeks.-not yet met          Independent with home exercise program within 16-20 weeks.-not yet met        Plan  Patient would benefit from: skilled PT  Planned modality interventions: cryotherapy, TENS, thermotherapy: hydrocollator packs and ultrasound  Planned therapy interventions: ADL training, body mechanics training, functional ROM exercises, home exercise program, joint mobilization, manual therapy, neuromuscular re-education, patient education, postural training, strengthening, stretching, therapeutic activities and therapeutic exercise  Frequency: 2-3 x's per week x 10 weeks.  Treatment plan discussed with: patient             Subjective: Feeling stronger.   Gets up to 3-4/10 pain randomly.   Sleep improving.   No use of pain medicaton.   Has been dressing and bathing independently.   Has returned to driving.   Stretching into external rotation specifically aggravating.  Remains out of work.  Is to follow up with Dr. Neville tomorrow.      Objective: See treatment diary below    Shoulder Comments   L shoulder:  Arthroscopic surgical incision sites healed     Sling has been discharged      AROM L shoulder FF: 80 degrees       PROM L  shoulder:  FF: 155 degrees  Scaption:165 degrees  ER: 60 degrees with end range capsular tightness and pain.  IR:55 degrees     AROM/Strength testing of L  shoulder NP secondary to recency of surgery.     L Elbow PROM: 0-145 degrees     L Wrist AROM:WFL     UE sensation intact to light touch.     Precautions: Follow rotator cuff protocol attached to chart.    DOS: 11/13/2023    Manuals 12/26/23 12/29/23 1/2/24          PROM L shoulder   (as tolerated) RG RG RG          Manual posterior capsule stretch RG RG NP (anterior pain)                                    Neuro Re-Ed                                                                                                        Ther Ex             UBE UBE2  2/2  L1 UBE2  3/3  L1 UBE2  3/3  L1.5          Pulley flexion 10sec hold x 5 min 10sec hold x 5 min  10sec hold x 5 min          Finger ladder 10sec x 5 10sec x 5 10sec x 5                       Arm on table, lean forward for self PROM ER 10sec x 10 10sec x 10 10sec x 10          Supine wand AAROM L shoulder ER with towel roll under arm at 90 ABD 10sec x 10 10sec x 10 10sec x 10          Sleeper IR/ER 30sec x 3  30sec x 3           Tband Rows Green  3sec  2x  10 Green  3sec  2 x 10 Green  3sec  2 x 15          Tband Extension  Green  3sec  2 x 10 Green  3sec  2 x 15          Supine AROM flexion   X 5          PT assist Flexion   2 x 10          Ther Activity                                       Gait Training                                       Modalities             Cryo L shoulder 10 min 10 min           IE/HEP

## 2024-01-02 NOTE — LETTER
2024    Bogdan Neville MD  [address] [facility] [address]    Patient: Tanya Padgett   YOB: 1975   Date of Visit: 2024     Encounter Diagnosis     ICD-10-CM    1. Traumatic complete tear of left rotator cuff, subsequent encounter  S46.012D       2. S/P left rotator cuff repair  Z98.890           Dear Dr. Neville:    Thank you for your recent referral of Tanya Padgett. Please review the attached evaluation summary from Tanya's recent visit.     Please verify that you agree with the plan of care by signing the attached order.     If you have any questions or concerns, please do not hesitate to call.     I sincerely appreciate the opportunity to share in the care of one of your patients and hope to have another opportunity to work with you in the near future.       Sincerely,    iNgel Ely, PT      Referring Provider:      I certify that I have read the below Plan of Care and certify the need for these services furnished under this plan of treatment while under my care.                    Bogdan Neville MD  [address] [place] [address]  Via Fax: [phone]          Progress  Note     Today's date: 2024  Patient name: Tanya Padgett  : 1975  MRN: [id number]  Referring provider: Bogdan Neville*  Dx:   Encounter Diagnosis     ICD-10-CM    1. Traumatic complete tear of left rotator cuff, subsequent encounter  S46.012D       2. S/P left rotator cuff repair  Z98.890                  Assessment  Assessment details: Patient is a 48 y.o. male who  presents with pain, range of motion loss, weakness s/p (L)  rotator cuff repair, biceps tenodesis,  distal clavicle excision and subacromial decompression 23.  Making progress 7 weeks, 1 day post op. Has moderate capsular tightness. Next phase of rehab starting work on AAROM then AROM and gentle strengthening in an effort to restore post operative function.      FOTO self rated  functional score improved to 53 from 25@ IE.       Understanding of Dx/Px/POC: excellent  Goals  ST : Decrease L shoulder pain to 2/10 at worst x 1 continuous week within 6 weeks-not yet met         PROM: Improve L shoulder PROM to 120 degrees for flexion/scaption, ER to 45 degrees, IR to 45 degrees within 6 weeks.-met         AROM Improve L shoulder AROM flexion to 90 degrees in 6-8 weeks.-not yet met         Pt to begin submaximal shoulder isometrics by 6 weeks post op.-met         Improved FOTO self rated functional scale score (25@ IE), pt to note greater ease with dressing, return to driving within 6 weeks.-met     LT : Eliminate L shoulder pain x 1 continuous week within 12 weeks.-not yet met         PROM: Improve L shoulder PROM to 170 degrees for flexion/scaption, ER to 90 degrees, IR to 75degrees within 12 weeks.-not yet met         AROM :  Improve L shoulder AROM to 170 degrees for flexion/scaption, ER to 90 degrees, IR to T7 level within 16 weeks. -not yet met         Strength: 5/5 L shoulder stength within 16 weeks -20 weeks.-not yet met         Function: FOTO score to be at least 67 within 16 weeks.-not yet met         Independent with home exercise program within 16-20 weeks.-not yet met        Plan  Patient would benefit from: skilled PT  Planned modality interventions: cryotherapy, TENS, thermotherapy: hydrocollator packs and ultrasound  Planned therapy interventions: ADL training, body mechanics training, functional ROM exercises, home exercise program, joint mobilization, manual therapy, neuromuscular re-education, patient education, postural training, strengthening, stretching, therapeutic activities and therapeutic exercise  Frequency: 2-3 x's per week x 10 weeks.  Treatment plan discussed with: patient             Subjective: Feeling stronger.   Gets up to 3-4/10 pain randomly.   Sleep improving.   No use of pain medicaton.   Has been dressing and bathing independently.   Has returned to  driving.   Stretching into external rotation specifically aggravating.  Remains out of work.  Is to follow up with Dr. Neville tomorrow.      Objective: See treatment diary below    Shoulder Comments   L shoulder:  Arthroscopic surgical incision sites healed     Sling has been discharged      AROM L shoulder FF: 80 degrees       PROM L  shoulder:  FF: 155 degrees  Scaption:165 degrees  ER: 60 degrees with end range capsular tightness and pain.  IR:55 degrees     AROM/Strength testing of L  shoulder NP secondary to recency of surgery.     L Elbow PROM: 0-145 degrees     L Wrist AROM:WFL     UE sensation intact to light touch.     Precautions: Follow rotator cuff protocol attached to chart.    DOS: 11/13/2023    Manuals 12/26/23 12/29/23 1/2/24          PROM L shoulder   (as tolerated) RG RG RG          Manual posterior capsule stretch RG RG NP (anterior pain)                                    Neuro Re-Ed                                                                                                        Ther Ex             UBE UBE2  2/2  L1 UBE2  3/3  L1 UBE2  3/3  L1.5          Pulley flexion 10sec hold x 5 min 10sec hold x 5 min 10sec hold x 5 min          Finger ladder 10sec x 5 10sec x 5 10sec x 5                       Arm on table, lean forward for self PROM ER 10sec x 10 10sec x 10 10sec x 10          Supine wand AAROM L shoulder ER with towel roll under arm at 90 ABD 10sec x 10 10sec x 10 10sec x 10          Sleeper IR/ER 30sec x 3  30sec x 3           Tband Rows Green  3sec  2x  10 Green  3sec  2 x 10 Green  3sec  2 x 15          Tband Extension  Green  3sec  2 x 10 Green  3sec  2 x 15          Supine AROM flexion   X 5          PT assist Flexion   2 x 10          Ther Activity                                       Gait Training                                       Modalities             Cryo L shoulder 10 min 10 min           IE/HEP

## 2024-01-05 ENCOUNTER — OFFICE VISIT (OUTPATIENT)
Dept: PHYSICAL THERAPY | Facility: CLINIC | Age: 49
End: 2024-01-05
Payer: COMMERCIAL

## 2024-01-05 DIAGNOSIS — S46.012D TRAUMATIC COMPLETE TEAR OF LEFT ROTATOR CUFF, SUBSEQUENT ENCOUNTER: Primary | ICD-10-CM

## 2024-01-05 DIAGNOSIS — Z98.890 S/P LEFT ROTATOR CUFF REPAIR: ICD-10-CM

## 2024-01-05 PROCEDURE — 97140 MANUAL THERAPY 1/> REGIONS: CPT | Performed by: PHYSICAL THERAPIST

## 2024-01-05 PROCEDURE — 97110 THERAPEUTIC EXERCISES: CPT | Performed by: PHYSICAL THERAPIST

## 2024-01-05 NOTE — PROGRESS NOTES
Daily Note     Today's date: 2024  Patient name: Tanya Padgett  : 1975  MRN: [id number]  Referring provider: Bogdan Neville*  Dx:   Encounter Diagnosis     ICD-10-CM    1. Traumatic complete tear of left rotator cuff, subsequent encounter  S46.012D       2. S/P left rotator cuff repair  Z98.890           Subjective: Patient tells me they are feeling a little better each week. Able to raise arm with greater ease.          Objective:  Rx as per treatment diary below  AROM L shoulder elavation in scaption improved to 130 degrees.      Assessment: Tolerated treatment well                          Functional limitations remain as follows:     -Unable to work     -Decreased lifting capacity     -Sleep interruption associated with pain  Patient would benefit from continued course of skilled physical therapy to address impairments in an effort to improve function.      Plan: Continue as per plan of care.              Precautions: Follow rotator cuff protocol attached to chart.    DOS: 2023    Manuals 23         PROM L shoulder   (as tolerated) RG RG RG RG         Manual posterior capsule stretch RG RG NP (anterior pain)                                    Neuro Re-Ed                                                                                                        Ther Ex             UBE UBE2  2/2  L1 UBE2  3/3  L1 UBE2  3/3  L1.5 UBE2  3/3  1.5         Pulley flexion 10sec hold x 5 min 10sec hold x 5 min 10sec hold x 5 min 10sec hold x 5 min         Finger ladder 10sec x 5 10sec x 5 10sec x 5 X 5                      Arm on table, lean forward for self PROM ER 10sec x 10 10sec x 10 10sec x 10          Supine wand AAROM L shoulder ER with towel roll under arm at 90 ABD 10sec x 10 10sec x 10 10sec x 10          Sleeper IR/ER 30sec x 3  30sec x 3 30sec x 3          Tband Rows Green  3sec  2x  10 Green  3sec  2 x 10 Green  3sec  2 x 15 Green  2 x 15         Tband  Extension  Green  3sec  2 x 10 Green  3sec  2 x 15 Green  2 x 15         Supine AROM flexion   X 5 X 10         PT assist Flexion   2 x 10 No assixt  10         SL ER    0#  2 x 10         Wand AA IR    10sec x 10         Ther Activity                                       Gait Training                                       Modalities             Cryo L shoulder 10 min 10 min           IE/HEP

## 2024-01-09 ENCOUNTER — OFFICE VISIT (OUTPATIENT)
Dept: PHYSICAL THERAPY | Facility: CLINIC | Age: 49
End: 2024-01-09
Payer: COMMERCIAL

## 2024-01-09 DIAGNOSIS — S46.012D TRAUMATIC COMPLETE TEAR OF LEFT ROTATOR CUFF, SUBSEQUENT ENCOUNTER: Primary | ICD-10-CM

## 2024-01-09 DIAGNOSIS — Z98.890 S/P LEFT ROTATOR CUFF REPAIR: ICD-10-CM

## 2024-01-09 DIAGNOSIS — S46.112D RUPTURE OF LEFT LONG HEAD BICEPS TENDON, SUBSEQUENT ENCOUNTER: ICD-10-CM

## 2024-01-09 PROCEDURE — 97140 MANUAL THERAPY 1/> REGIONS: CPT

## 2024-01-09 PROCEDURE — 97110 THERAPEUTIC EXERCISES: CPT

## 2024-01-09 NOTE — PROGRESS NOTES
"Daily Note     Today's date: 2024  Patient name: Tanya Padgett  : 1975  MRN: [id number]  Referring provider: Bogdan Neville*  Dx:   Encounter Diagnosis     ICD-10-CM    1. Traumatic complete tear of left rotator cuff, subsequent encounter  S46.012D       2. S/P left rotator cuff repair  Z98.890       3. Rupture of left long head biceps tendon, subsequent encounter  S46.112D               Subjective: Patient reported having some sleep disruptions associated with shoulder pain. Has difficulty with reaching behind back on L. Eager to return to work on Monday.      Objective: Rx as per treatment diary below.      Assessment: 8 weeks, 1 day post-op. Still with tissue tightness and capsular restrictions limiting his ROM. Continued to follow current post-op protocol and is making steady progress at this time. Will continue to progress as able to further improve shoulder ROM OH and address strength deficits.      Plan: Continue as per plan of care.        Precautions: Follow rotator cuff protocol attached to chart.    DOS: 2023    Manuals 23        PROM L shoulder   (as tolerated) RG RG RG RG EH        Manual posterior capsule stretch RG RG NP (anterior pain)                                    Neuro Re-Ed                                                                                                        Ther Ex             UBE UBE2  2/2  L1 UBE2  3/3  L1 UBE2  3/3  L1.5 UBE2  3/3  1.5 UBE 1  3 min ea  L2        Pulley flexion 10sec hold x 5 min 10sec hold x 5 min 10sec hold x 5 min 10sec hold x 5 min 10\" hold,  5 min        Finger ladder 10sec x 5 10sec x 5 10sec x 5 X 5 10\"x5                     Arm on table, lean forward for self PROM ER 10sec x 10 10sec x 10 10sec x 10          Supine wand AAROM L shoulder ER with towel roll under arm at 90 ABD 10sec x 10 10sec x 10 10sec x 10          Sleeper IR/ER 30sec x 3  30sec x 3 30sec x 3 30\"x3  ea        Tband Rows " "Green  3sec  2x  10 Green  3sec  2 x 10 Green  3sec  2 x 15 Green  2 x 15  GTB   2x15        Tband Extension  Green  3sec  2 x 10 Green  3sec  2 x 15 Green  2 x 15  GTB   2x15        Supine AROM flexion   X 5 X 10  x10        PT assist Flexion   2 x 10 No assixt  10  No assist   x10        SL ER    0#  2 x 10  2x10        Wand AA IR    10sec x 10  10\"x   10                     Ther Activity                                       Gait Training                                       Modalities             Cryo L shoulder 10 min 10 min           IE/HEP                  "

## 2024-01-12 ENCOUNTER — OFFICE VISIT (OUTPATIENT)
Dept: PHYSICAL THERAPY | Facility: CLINIC | Age: 49
End: 2024-01-12
Payer: COMMERCIAL

## 2024-01-12 DIAGNOSIS — S46.112D RUPTURE OF LEFT LONG HEAD BICEPS TENDON, SUBSEQUENT ENCOUNTER: ICD-10-CM

## 2024-01-12 DIAGNOSIS — S46.012D TRAUMATIC COMPLETE TEAR OF LEFT ROTATOR CUFF, SUBSEQUENT ENCOUNTER: Primary | ICD-10-CM

## 2024-01-12 DIAGNOSIS — Z98.890 S/P LEFT ROTATOR CUFF REPAIR: ICD-10-CM

## 2024-01-12 PROCEDURE — 97110 THERAPEUTIC EXERCISES: CPT | Performed by: PHYSICAL THERAPIST

## 2024-01-12 PROCEDURE — 97140 MANUAL THERAPY 1/> REGIONS: CPT | Performed by: PHYSICAL THERAPIST

## 2024-01-12 NOTE — PROGRESS NOTES
"Daily Note     Today's date: 2024  Patient name: Tanya Padgett  : 1975  MRN: [id number]  Referring provider: Bogdan Neville*  Dx:   Encounter Diagnosis     ICD-10-CM    1. Traumatic complete tear of left rotator cuff, subsequent encounter  S46.012D       2. S/P left rotator cuff repair  Z98.890       3. Rupture of left long head biceps tendon, subsequent encounter  S46.112D                 Subjective: Sleeping better since last visit. Will RTW on Monday 1/15/24.    Objective: Rx as per treatment diary below.    Reyes CRT assisted me with supervision/therex instruction during today's visit.    Assessment: Functional ROM and strength continue to improve. Patient would benefit from continued course of skilled physical therapy to address impairments in an effort to improve function.        Plan: Continue as per plan of care. Monitor response of shoulder to  RTW next week.        Precautions: Follow rotator cuff protocol attached to chart.    DOS: 2023    Manuals 23       PROM L shoulder   (as tolerated) RG RG RG RG EH RG       Manual posterior capsule stretch RG RG NP (anterior pain)   RG                                 Neuro Re-Ed                                                                                                        Ther Ex             UBE UBE2  2/2  L1 UBE2  3/3  L1 UBE2  3/3  L1.5 UBE2  3/3  1.5 UBE 1  3 min ea  L2 UBE2  3/3  L3       Pulley flexion 10sec hold x 5 min 10sec hold x 5 min 10sec hold x 5 min 10sec hold x 5 min 10\" hold,  5 min 10sec hold x 5min       Finger ladder 10sec x 5 10sec x 5 10sec x 5 X 5 10\"x5 10sec x 5                    Arm on table, lean forward for self PROM ER 10sec x 10 10sec x 10 10sec x 10          Supine wand AAROM L shoulder ER with towel roll under arm at 90 ABD 10sec x 10 10sec x 10 10sec x 10          Sleeper IR/ER 30sec x 3  30sec x 3 30sec x 3 30\"x3  ea 30sec x 3       Tband Rows " "Green  3sec  2x  10 Green  3sec  2 x 10 Green  3sec  2 x 15 Green  2 x 15  GTB   2x15 Green  2 x 15       Tband Extension  Green  3sec  2 x 10 Green  3sec  2 x 15 Green  2 x 15  GTB   2x15 Green  2 x 15       Supine AROM flexion   X 5 X 10  x10 2 x 10       PT assist Flexion   2 x 10 No assixt  10  No assist   x10 No asssit  2 x 10(scaption)       SL ER    0#  2 x 10  2x10 0#  2 x 10       Wand AA IR    10sec x 10  10\"x   10 10sec x 10       Tband IR      Green  2 x 15       Ther Activity                                       Gait Training                                       Modalities             Cryo L shoulder 10 min 10 min           IE/HEP                  "

## 2024-01-15 ENCOUNTER — OFFICE VISIT (OUTPATIENT)
Dept: PHYSICAL THERAPY | Facility: CLINIC | Age: 49
End: 2024-01-15
Payer: COMMERCIAL

## 2024-01-15 DIAGNOSIS — Z98.890 S/P LEFT ROTATOR CUFF REPAIR: ICD-10-CM

## 2024-01-15 DIAGNOSIS — S46.012D TRAUMATIC COMPLETE TEAR OF LEFT ROTATOR CUFF, SUBSEQUENT ENCOUNTER: Primary | ICD-10-CM

## 2024-01-15 PROCEDURE — 97140 MANUAL THERAPY 1/> REGIONS: CPT | Performed by: PHYSICAL THERAPIST

## 2024-01-15 PROCEDURE — 97110 THERAPEUTIC EXERCISES: CPT | Performed by: PHYSICAL THERAPIST

## 2024-01-15 NOTE — PROGRESS NOTES
"Daily Note     Today's date: 1/15/2024  Patient name: Tanya Padgett  : 1975  MRN: [id number]  Referring provider: Bogdan Neville*  Dx:   Encounter Diagnosis     ICD-10-CM    1. Traumatic complete tear of left rotator cuff, subsequent encounter  S46.012D       2. S/P left rotator cuff repair  Z98.890                   Subjective: Strained L shoulder yesterday AM. He was laying in bed, was startled by his dog and cat and pushed up hard using L arm against bed. Increased pain since that time. He does not think he retore his repair.  Returned to work today, did not have to use L arm much.    Objective: Rx as per treatment diary below.  Has 4-/5 L shoulder flexion strength, still able to elevate L arm without shrug/compensation  Drop Arm (-) on L      Assessment: Suffered a L shoulder strain since last visit. Exam today does not point to any new internal derangement. Still tolerated session well. Encouraged continued use of L UE for ADLs, continued HEP so he does not get tight.        Plan: Continue as per plan of care.   Precautions: Follow rotator cuff protocol attached to chart.    DOS: 2023    Manuals 12/26/23 12/29/23 1/2/24 1/5/24 1/9 1/12 1/15      PROM L shoulder   (as tolerated) RG RG RG RG EH RG RG      Manual posterior capsule stretch RG RG NP (anterior pain)   RG RG                                Neuro Re-Ed                                                                                                        Ther Ex             UBE UBE2  2/2  L1 UBE2  3/3  L1 UBE2  3/3  L1.5 UBE2  3/3  1.5 UBE 1  3 min ea  L2 UBE2  3/3  L3 UBE@  L2.5      Pulley flexion 10sec hold x 5 min 10sec hold x 5 min 10sec hold x 5 min 10sec hold x 5 min 10\" hold,  5 min 10sec hold x 5min 10sec hold x 4.5 min      Finger ladder 10sec x 5 10sec x 5 10sec x 5 X 5 10\"x5 10sec x 5 10sec x 5                   Arm on table, lean forward for self PROM ER 10sec x 10 10sec x 10 10sec x 10          Supine wand HUMBERTOOM L " 16.15 Received the pt from main ED From RN Rozina   for the management of Chest pain  . Pt is here for cardiac eval and stress test  tomorrow    pt  is alert and responsive, oriented x4, denies any respiratory distress,  CP SOB, or difficulty breathing.  Pt is oriented to the unit    comfort care & safety measures initiated  IV in place, patent and free of signs of infiltration, V/S  Pt is Tachycardiac 105/mt  [place] aware of the same , pt afebrile, pt denies pain at this time. , instructed patient to notify RN of any needed assistance, Pt verbalizes understanding, Call bell placed within reach. Safety maintained. Will continue to monitor [id number]  Report given to Floor  Pt is on  heparin Drip Next PTT is 22.15 Hrs.  Pt denies any CP Now  has stable vitals pt stable to go to floor "shoulder ER with towel roll under arm at 90 ABD 10sec x 10 10sec x 10 10sec x 10          Sleeper IR/ER 30sec x 3  30sec x 3 30sec x 3 30\"x3  ea 30sec x 3 30sec x 3      Tband Rows Green  3sec  2x  10 Green  3sec  2 x 10 Green  3sec  2 x 15 Green  2 x 15  GTB   2x15 Green  2 x 15 Green  2 x 15      Tband Extension  Green  3sec  2 x 10 Green  3sec  2 x 15 Green  2 x 15  GTB   2x15 Green  2 x 15 Green  2  z15      Supine AROM flexion   X 5 X 10  x10 2 x 10 2 x 10      PT assist Flexion   2 x 10 No assixt  10  No assist   x10 No asssit  2 x 10(scaption) 2 x10 (no shrug)      SL ER    0#  2 x 10  2x10 0#  2 x 10 0#  2 x 15      Wand AA IR    10sec x 10  10\"x   10 10sec x 10 10sec x 10      Tband IR      Green  2 x 15 Green  2 x 15      Ther Activity                                       Gait Training                                       Modalities             Cryo L shoulder 10 min 10 min     10 min      IE/HEP                  " Received pt from ИРИНА Montalvo, received pt alert and responsive, oriented x4, denies any respiratory distress, SOB, or difficulty breathing. Pt transferred to CDU for chest pain. Pt is currently chest pain free but c/o "feeling nervous and weak" and c/o 4/10 headache, pt offered pain medication but is currently declining. Pt aware to notify staff should pain increase/ change.  Pt currently denies SOB, diaphoresis, dizziness, lightheadedness, N/V, F/C. On telemetry pt is SR HR 90's to Sinus tachy low 100's. Pending stress test in Am pt is aware not to consume caffeine prior to test, verbalized understanding. , IV in place, patent and free of signs of infiltration, , V/S stable, pt afebrile,  Pt educated on unit and unit rules, instructed patient to notify RN of any needed assistance, Pt verbalizes understanding, Call bell placed within reach. Safety maintained. Will continue to monitor. Family at bedside. 07.00 Am Received pt from ИРИНА Goel Pt is observed for Chest pain for nuclear stress test    07.30  Am received pt alert and responsive, oriented x4, denies any respiratory distress, SOB, or difficulty breathing. Pt is currently chest pain free   Pt aware to notify staff should pain increase/ change.  Pt currently denies SOB, diaphoresis, dizziness, lightheadedness, N/V, F/C. On telemetry pt is SR HR 88 pt is aware not to consume caffeine prior to test, verbalized understanding. , IV in place, patent and free of signs of infiltration, , V/S stable, pt afebrile,  Pt educated on unit and unit rules, instructed patient to notify RN of any needed assistance, Pt verbalizes understanding, Call bell placed within reach. Safety maintained. Will continue to monitor. Family at bedside.  08.00 Am Pt went for stress test

## 2024-01-18 ENCOUNTER — OFFICE VISIT (OUTPATIENT)
Dept: PHYSICAL THERAPY | Facility: CLINIC | Age: 49
End: 2024-01-18
Payer: COMMERCIAL

## 2024-01-18 DIAGNOSIS — S46.012D TRAUMATIC COMPLETE TEAR OF LEFT ROTATOR CUFF, SUBSEQUENT ENCOUNTER: Primary | ICD-10-CM

## 2024-01-18 DIAGNOSIS — Z98.890 S/P LEFT ROTATOR CUFF REPAIR: ICD-10-CM

## 2024-01-18 DIAGNOSIS — S46.112D RUPTURE OF LEFT LONG HEAD BICEPS TENDON, SUBSEQUENT ENCOUNTER: ICD-10-CM

## 2024-01-18 PROCEDURE — 97140 MANUAL THERAPY 1/> REGIONS: CPT | Performed by: PHYSICAL THERAPIST

## 2024-01-18 PROCEDURE — 97110 THERAPEUTIC EXERCISES: CPT | Performed by: PHYSICAL THERAPIST

## 2024-01-18 NOTE — PROGRESS NOTES
"Daily Note     Today's date: 2024  Patient name: Tanya Padgett  : 1975  MRN: [id number]  Referring provider: Bogdan Neville*  Dx:   Encounter Diagnosis     ICD-10-CM    1. Traumatic complete tear of left rotator cuff, subsequent encounter  S46.012D       2. S/P left rotator cuff repair  Z98.890       3. Rupture of left long head biceps tendon, subsequent encounter  S46.112D           Start Time: 1620  Stop Time: 1709  Total time in clinic (min): 49 minutes    Subjective: Not as much as reported at time of last visit. Some stiffness and soreness anterior with raising arm.  2-3/10 pain today.    Objective: See treatment diary below.  Has 4/5 L shoulder flexion strength.       Assessment: Tolerated treatment well. Strain he suffered earlier this week is resolving.Nine weeks, 3 days post op. Making appropriate strength gains. Patient would benefit from continued course of skilled physical therapy to address impairments in an effort to improve function.          Plan: Continue per plan of care.      Precautions: Follow rotator cuff protocol attached to chart.    DOS: 2023    Manuals 23      PROM L shoulder   (as tolerated) RG RG RG RG EH RG RG      Manual posterior capsule stretch RG RG NP (anterior pain)   RG RG                                Neuro Re-Ed                                                                                                        Ther Ex             UBE UBE2  2/2  L1 UBE2  3/3  L1 UBE2  3/3  L1.5 UBE2  3/3  1.5 UBE 1  3 min ea  L2 UBE2  3/3  L3 UBE2  3/3  L2.5      Pulley flexion 10sec hold x 5 min 10sec hold x 5 min 10sec hold x 5 min 10sec hold x 5 min 10\" hold,  5 min 10sec hold x 5min 10sec hold x 4.5 min      Finger ladder 10sec x 5 10sec x 5 10sec x 5 X 5 10\"x5 10sec x 5 10sec x 5                   Arm on table, lean forward for self PROM ER 10sec x 10 10sec x 10 10sec x 10          Supine wand AAROM L shoulder ER " "with towel roll under arm at 90 ABD 10sec x 10 10sec x 10 10sec x 10    10sec x 10      Sleeper IR/ER 30sec x 3  30sec x 3 30sec x 3 30\"x3  ea 30sec x 3 30sec x 3      Tband Rows Green  3sec  2x  10 Green  3sec  2 x 10 Green  3sec  2 x 15 Green  2 x 15  GTB   2x15 Green  2 x 15 Green  2 x 15      Tband Extension  Green  3sec  2 x 10 Green  3sec  2 x 15 Green  2 x 15  GTB   2x15 Green  2 x 15 Green  2 x 15      Supine AROM flexion   X 5 X 10  x10 2 x 10 1#  2 x 10      PT assist Flexion   2 x 10 No assixt  10  No assist   x10 No asssit  2 x 10(scaption) AROM  2 x 10      SL ER    0#  2 x 10  2x10 0#  2 x 10 1#  2 x 10      Wand AA IR    10sec x 10  10\"x   10 10sec x 10       Tband IR      Green  2 x 15 Green  2 x 15      Strap IR       10secx  10      Ther Activity                                       Gait Training                                       Modalities             Cryo L shoulder 10 min 10 min           IE/HEP                    "

## 2024-01-22 ENCOUNTER — OFFICE VISIT (OUTPATIENT)
Dept: PHYSICAL THERAPY | Facility: CLINIC | Age: 49
End: 2024-01-22
Payer: COMMERCIAL

## 2024-01-22 DIAGNOSIS — Z98.890 S/P LEFT ROTATOR CUFF REPAIR: ICD-10-CM

## 2024-01-22 DIAGNOSIS — S46.012D TRAUMATIC COMPLETE TEAR OF LEFT ROTATOR CUFF, SUBSEQUENT ENCOUNTER: Primary | ICD-10-CM

## 2024-01-22 DIAGNOSIS — S46.112D RUPTURE OF LEFT LONG HEAD BICEPS TENDON, SUBSEQUENT ENCOUNTER: ICD-10-CM

## 2024-01-22 PROCEDURE — 97140 MANUAL THERAPY 1/> REGIONS: CPT | Performed by: PHYSICAL THERAPIST

## 2024-01-22 PROCEDURE — 97110 THERAPEUTIC EXERCISES: CPT | Performed by: PHYSICAL THERAPIST

## 2024-01-22 NOTE — PROGRESS NOTES
"Daily Note     Today's date: 2024  Patient name: Tanya Padgett  : 1975  MRN: [id number]  Referring provider: Bogdan Neville*  Dx:   Encounter Diagnosis     ICD-10-CM    1. Traumatic complete tear of left rotator cuff, subsequent encounter  S46.012D       2. S/P left rotator cuff repair  Z98.890       3. Rupture of left long head biceps tendon, subsequent encounter  S46.112D           Start Time: 1618  Stop Time: 1659  Total time in clinic (min): 41 minutes    Subjective: Patient offers no new complaints.       Objective: See treatment diary below      Assessment: Patient continues to tolerate treatment, per protocol, with minimal soreness noted post session. Capsular end feel noted with end range passive motion. Progress, as able.       Plan: Continue per plan of care.  Progress treament per protocol.      Precautions: Follow rotator cuff protocol attached to chart.    DOS: 2023    Manuals 23     PROM L shoulder   (as tolerated) RG RG RG RG EH RG RG RG     Manual posterior capsule stretch RG RG NP (anterior pain)   RG RG JAB                               Neuro Re-Ed                                                                                                        Ther Ex             UBE UBE2  2/2  L1 UBE2  3/3  L1 UBE2  3/3  L1.5 UBE2  3/3  1.5 UBE 1  3 min ea  L2 UBE2  3/3  L3 UBE2  3/3  L2.5 UBE 3/3 L2.5     Pulley flexion 10sec hold x 5 min 10sec hold x 5 min 10sec hold x 5 min 10sec hold x 5 min 10\" hold,  5 min 10sec hold x 5min 10sec hold x 4.5 min 10\" hold x 5 min     Finger ladder 10sec x 5 10sec x 5 10sec x 5 X 5 10\"x5 10sec x 5 10sec x 5 10 sec x 5                  Arm on table, lean forward for self PROM ER 10sec x 10 10sec x 10 10sec x 10          Supine wand AAROM L shoulder ER with towel roll under arm at 90 ABD 10sec x 10 10sec x 10 10sec x 10    10sec x 10 10x10\"     Sleeper IR/ER 30sec x 3  30sec x 3 30sec x 3 30\"x3  ea " "30sec x 3 30sec x 3 nv     Tband Rows Green  3sec  2x  10 Green  3sec  2 x 10 Green  3sec  2 x 15 Green  2 x 15  GTB   2x15 Green  2 x 15 Green  2 x 15 Green 2x15     Tband Extension  Green  3sec  2 x 10 Green  3sec  2 x 15 Green  2 x 15  GTB   2x15 Green  2 x 15 Green  2 x 15 Green 2x15     Supine AROM flexion   X 5 X 10  x10 2 x 10 1#  2 x 10 1# 2x10     PT assist Flexion   2 x 10 No assixt  10  No assist   x10 No asssit  2 x 10(scaption) AROM  2 x 10 AROM scap 2x10     SL ER    0#  2 x 10  2x10 0#  2 x 10 1#  2 x 10 1# 2x10     Wand AA IR    10sec x 10  10\"x   10 10sec x 10       Tband IR      Green  2 x 15 Green  2 x 15 Green 2x15     Strap IR       10secx  10 10x10 sec     Ther Activity                                       Gait Training                                       Modalities             Cryo L shoulder 10 min 10 min           IE/HEP                      "

## 2024-01-25 ENCOUNTER — OFFICE VISIT (OUTPATIENT)
Dept: PHYSICAL THERAPY | Facility: CLINIC | Age: 49
End: 2024-01-25
Payer: COMMERCIAL

## 2024-01-25 DIAGNOSIS — S46.012D TRAUMATIC COMPLETE TEAR OF LEFT ROTATOR CUFF, SUBSEQUENT ENCOUNTER: Primary | ICD-10-CM

## 2024-01-25 DIAGNOSIS — Z98.890 S/P LEFT ROTATOR CUFF REPAIR: ICD-10-CM

## 2024-01-25 DIAGNOSIS — S46.112D RUPTURE OF LEFT LONG HEAD BICEPS TENDON, SUBSEQUENT ENCOUNTER: ICD-10-CM

## 2024-01-25 PROCEDURE — 97140 MANUAL THERAPY 1/> REGIONS: CPT | Performed by: PHYSICAL THERAPIST

## 2024-01-25 PROCEDURE — 97110 THERAPEUTIC EXERCISES: CPT | Performed by: PHYSICAL THERAPIST

## 2024-01-25 NOTE — PROGRESS NOTES
"Daily Note     Today's date: 2024  Patient name: Tanya Padgett  : 1975  MRN: [id number]  Referring provider: Bogdan Neville*  Dx:   Encounter Diagnosis     ICD-10-CM    1. Traumatic complete tear of left rotator cuff, subsequent encounter  S46.012D       2. Rupture of left long head biceps tendon, subsequent encounter  S46.112D       3. S/P left rotator cuff repair  Z98.890             Start Time: 1630  Stop Time: 1718  Total time in clinic (min): 48 minutes    Subjective: Some sitffness and soreness, but still feels his L shoulder is improving overall.        Objective: See treatment diary below  Broke through some adhesion during PROM ER with immediate improvement of about 5 degrees and less end range pain.      Assessment: Did well with all progression. Patient would benefit from continued course of skilled physical therapy to address impairments in an effort to improve function.      Plan: Continue per plan of care.  Progress treament per protocol.      Precautions: Follow rotator cuff protocol attached to chart.    DOS: 2023    Manuals 23    PROM L shoulder   (as tolerated) RG RG RG RG EH RG RG RG RG    Manual posterior capsule stretch RG RG NP (anterior pain)   RG RG JAB RG                              Neuro Re-Ed                                                                                                        Ther Ex             UBE UBE2  2/2  L1 UBE2  3/3  L1 UBE2  3/3  L1.5 UBE2  3/3  1.5 UBE 1  3 min ea  L2 UBE2  3/3  L3 UBE2  3/3  L2.5 UBE 3/3 L2.5 UBE  3/3  L3.0    Pulley flexion 10sec hold x 5 min 10sec hold x 5 min 10sec hold x 5 min 10sec hold x 5 min 10\" hold,  5 min 10sec hold x 5min 10sec hold x 4.5 min 10\" hold x 5 min 10sec hold x 4.5 min    Finger ladder 10sec x 5 10sec x 5 10sec x 5 X 5 10\"x5 10sec x 5 10sec x 5 10 sec x 5 D/C                 Arm on table, lean forward for self PROM ER 10sec x 10 10sec x 10 " "10sec x 10          Supine wand AAROM L shoulder ER with towel roll under arm at 90 ABD 10sec x 10 10sec x 10 10sec x 10    10sec x 10 10x10\" 10sec x 10    Sleeper IR/ER 30sec x 3  30sec x 3 30sec x 3 30\"x3  ea 30sec x 3 30sec x 3 nv 30sec x 3    Tband Rows Green  3sec  2x  10 Green  3sec  2 x 10 Green  3sec  2 x 15 Green  2 x 15  GTB   2x15 Green  2 x 15 Green  2 x 15 Green 2x15 Blue  2 x 10    Tband Extension  Green  3sec  2 x 10 Green  3sec  2 x 15 Green  2 x 15  GTB   2x15 Green  2 x 15 Green  2 x 15 Green 2x15 Blue  2 x 10    Supine AROM flexion   X 5 X 10  x10 2 x 10 1#  2 x 10 1# 2x10 D/C    PT assist Flexion   2 x 10 No assixt  10  No assist   x10 No asssit  2 x 10(scaption) AROM  2 x 10 AROM scap 2x10 1#  2 x 10    SL ER    0#  2 x 10  2x10 0#  2 x 10 1#  2 x 10 1# 2x10 1#  2 x 15    Wand AA IR    10sec x 10  10\"x   10 10sec x 10       Tband IR      Green  2 x 15 Green  2 x 15 Green 2x15 Blue  2 x 10    Strap IR       10secx  10 10x10 sec 10sec x 5    AROM IR         10sec x 5    Prone middle traps         0#  2 x 10    Ball circles at wall         10cw/ccw x 4    Tband ER         NV    Ther Activity                                       Gait Training                                       Modalities             Cryo L shoulder 10 min 10 min           IE/HEP                      "

## 2024-01-29 ENCOUNTER — OFFICE VISIT (OUTPATIENT)
Dept: PHYSICAL THERAPY | Facility: CLINIC | Age: 49
End: 2024-01-29
Payer: COMMERCIAL

## 2024-01-29 DIAGNOSIS — S46.012D TRAUMATIC COMPLETE TEAR OF LEFT ROTATOR CUFF, SUBSEQUENT ENCOUNTER: Primary | ICD-10-CM

## 2024-01-29 DIAGNOSIS — S46.112D RUPTURE OF LEFT LONG HEAD BICEPS TENDON, SUBSEQUENT ENCOUNTER: ICD-10-CM

## 2024-01-29 DIAGNOSIS — Z98.890 S/P LEFT ROTATOR CUFF REPAIR: ICD-10-CM

## 2024-01-29 PROCEDURE — 97140 MANUAL THERAPY 1/> REGIONS: CPT | Performed by: PHYSICAL THERAPIST

## 2024-01-29 PROCEDURE — 97110 THERAPEUTIC EXERCISES: CPT | Performed by: PHYSICAL THERAPIST

## 2024-01-29 NOTE — PROGRESS NOTES
"Daily Note     Today's date: 2024  Patient name: Tanya Padgett  : 1975  MRN: [id number]  Referring provider: Bogdan Neville*  Dx:   Encounter Diagnosis     ICD-10-CM    1. Traumatic complete tear of left rotator cuff, subsequent encounter  S46.012D       2. Rupture of left long head biceps tendon, subsequent encounter  S46.112D       3. S/P left rotator cuff repair  Z98.890                        Subjective: Shoulder feels \"a lttle freeer,\" since breaking through adhesion last visit. One episodeof L shoulder pain which woke hip up while laying on L side.          Objective: See treatment diary below  Broke through some adhesion during PROM ER with immediate improvement of about 5 degrees and less end range pain.      Assessment: Did well with all progression. Patient would benefit from continued course of skilled physical therapy to address impairments in an effort to improve function.      Plan: Continue per plan of care.  Progress treament per protocol.      Precautions: Follow rotator cuff protocol attached to chart.    DOS: 2023    Manuals 23   PROM L shoulder   (as tolerated) RG RG RG RG EH RG RG RG RG RG   Manual posterior capsule stretch RG RG NP (anterior pain)   RG RG JAB RG RG                             Neuro Re-Ed                                                                                                        Ther Ex             UBE UBE2  2/2  L1 UBE2  3/3  L1 UBE2  3/3  L1.5 UBE2  3/3  1.5 UBE 1  3 min ea  L2 UBE2  3/3  L3 UBE2  3/3  L2.5 UBE 3/3 L2.5 UBE  3/3  L3.0 UBE2  3/3  L.30   Pulley flexion 10sec hold x 5 min 10sec hold x 5 min 10sec hold x 5 min 10sec hold x 5 min 10\" hold,  5 min 10sec hold x 5min 10sec hold x 4.5 min 10\" hold x 5 min 10sec hold x 4.5 min 10sec hold x 4. 5min   Finger ladder 10sec x 5 10sec x 5 10sec x 5 X 5 10\"x5 10sec x 5 10sec x 5 10 sec x 5 D/C                 Arm on table, lean " "forward for self PROM ER 10sec x 10 10sec x 10 10sec x 10          Supine wand AAROM L shoulder ER with towel roll under arm at 90 ABD 10sec x 10 10sec x 10 10sec x 10    10sec x 10 10x10\" 10sec x 10 10sec  10   Sleeper IR/ER 30sec x 3  30sec x 3 30sec x 3 30\"x3  ea 30sec x 3 30sec x 3 nv 30sec x 3 IR  30sec x 3   Tband Rows Green  3sec  2x  10 Green  3sec  2 x 10 Green  3sec  2 x 15 Green  2 x 15  GTB   2x15 Green  2 x 15 Green  2 x 15 Green 2x15 Blue  2 x 10 Blue  2 x 15   Tband Extension  Green  3sec  2 x 10 Green  3sec  2 x 15 Green  2 x 15  GTB   2x15 Green  2 x 15 Green  2 x 15 Green 2x15 Blue  2 x 10 Blue  2 x 15   Supine AROM flexion   X 5 X 10  x10 2 x 10 1#  2 x 10 1# 2x10 D/C    PT assist Flexion   2 x 10 No assixt  10  No assist   x10 No asssit  2 x 10(scaption) AROM  2 x 10 AROM scap 2x10 1#  2 x 10 AROM scaption  1#  2 x 10   SL ER    0#  2 x 10  2x10 0#  2 x 10 1#  2 x 10 1# 2x10 1#  2 x 15 1#  2 x 15   SL ABD          0#  2 x 15   Wand AA IR    10sec x 10  10\"x   10 10sec x 10       Tband IR      Green  2 x 15 Green  2 x 15 Green 2x15 Blue  2 x 10 Blue  2 x 15   Strap IR       10secx  10 10x10 sec 10sec x 5    AROM IR         10sec x 5    Prone middle traps         0#  2 x 10 0#  2 x 15   Prone lower trap          0#  2 x 10   Ball circles at wall         10cw/ccw x 4    Tband ER         NV Green  2 x 10   Ther Activity                                       Gait Training                                       Modalities             Cryo L shoulder 10 min 10 min           IE/HEP                      "

## 2024-02-01 ENCOUNTER — OFFICE VISIT (OUTPATIENT)
Dept: PHYSICAL THERAPY | Facility: CLINIC | Age: 49
End: 2024-02-01
Payer: COMMERCIAL

## 2024-02-01 DIAGNOSIS — S46.112D RUPTURE OF LEFT LONG HEAD BICEPS TENDON, SUBSEQUENT ENCOUNTER: ICD-10-CM

## 2024-02-01 DIAGNOSIS — Z98.890 S/P LEFT ROTATOR CUFF REPAIR: ICD-10-CM

## 2024-02-01 DIAGNOSIS — S46.012D TRAUMATIC COMPLETE TEAR OF LEFT ROTATOR CUFF, SUBSEQUENT ENCOUNTER: Primary | ICD-10-CM

## 2024-02-01 PROCEDURE — 97110 THERAPEUTIC EXERCISES: CPT | Performed by: PHYSICAL THERAPIST

## 2024-02-01 PROCEDURE — 97140 MANUAL THERAPY 1/> REGIONS: CPT | Performed by: PHYSICAL THERAPIST

## 2024-02-01 NOTE — PROGRESS NOTES
Daily Note     Today's date: 2024  Patient name: Tanya Padgett  : 1975  MRN: [id number]  Referring provider: Bogdan Neville*  Dx:   Encounter Diagnosis     ICD-10-CM    1. Traumatic complete tear of left rotator cuff, subsequent encounter  S46.012D       2. Rupture of left long head biceps tendon, subsequent encounter  S46.112D       3. S/P left rotator cuff repair  Z98.890                        Subjective: Feeling better overall. Less pain.    Objective: See treatment diary below.  Progressed UE strengthening program.  Has full PROM today with only mild end range rotational tightness (best since starting therapy)    Assessment: Good gains in ROM and strength. Patient would benefit from continued course of skilled physical therapy to address impairments in an effort to improve function.      Plan: Continue per plan of care.  Progress treament per protocol.      Precautions: Follow rotator cuff protocol attached to chart.    DOS: 2023    Manuals 24            PROM L shoulder   (as tolerated) RG            Manual posterior capsule stretch RG                                      Neuro Re-Ed                                                                                                        Ther Ex             UBE UBE2  3/3  L3.5            Pulley flexion 10sec hold x 4 min                                                   Supine wand AAROM L shoulder ER with towel roll under arm at 90 ABD 10sec x 10            Sleeper IR 30sec x 3            Tband Rows Blue  2 x 15            Tband Extension Blue  2 x 15            Elevation in scaption 1#  2 x 15            SL ER 1#  2 x 15            SL ABD 1#  2 x 15            Tband IR Blue  2  x15            Strap IR 10sec x 10            Prone middle traps 0#  2 x 10            Prone lower trap 0#  2 x 10            Ball circles at wall 20cw/ccw   x 3            Tband ER Green  2  x 15            Ther Activity                                        Gait Training                                       Modalities

## 2024-02-05 ENCOUNTER — OFFICE VISIT (OUTPATIENT)
Dept: PHYSICAL THERAPY | Facility: CLINIC | Age: 49
End: 2024-02-05
Payer: COMMERCIAL

## 2024-02-05 DIAGNOSIS — S46.012D TRAUMATIC COMPLETE TEAR OF LEFT ROTATOR CUFF, SUBSEQUENT ENCOUNTER: Primary | ICD-10-CM

## 2024-02-05 DIAGNOSIS — S46.112D RUPTURE OF LEFT LONG HEAD BICEPS TENDON, SUBSEQUENT ENCOUNTER: ICD-10-CM

## 2024-02-05 DIAGNOSIS — Z98.890 S/P LEFT ROTATOR CUFF REPAIR: ICD-10-CM

## 2024-02-05 PROCEDURE — 97140 MANUAL THERAPY 1/> REGIONS: CPT

## 2024-02-05 PROCEDURE — 97110 THERAPEUTIC EXERCISES: CPT

## 2024-02-05 NOTE — PROGRESS NOTES
"Daily Note     Today's date: 2024  Patient name: Tanya Padgett  : 1975  MRN: [id number]  Referring provider: Bogdan Neville*  Dx:   Encounter Diagnosis     ICD-10-CM    1. Traumatic complete tear of left rotator cuff, subsequent encounter  S46.012D       2. Rupture of left long head biceps tendon, subsequent encounter  S46.112D       3. S/P left rotator cuff repair  Z98.890                      Subjective: Patient continues to feel both his pain and function are improving and has been having minimal limitations.      Objective: See treatment diary below.      Assessment: 3 months post-op and in phase III of rehab protocol. Has been making great progress through skilled PT by demonstrating improved ROM and strength. Still limited with more endurance related activities such as during ball circles on wall. Will continue to advance more functional strengthening and stability as baseline strength improves.      Plan: Continue per plan of care.  Progress treatment as tolerated.           Precautions: Follow rotator cuff protocol attached to chart.    DOS: 2023    Manuals 24 2/5           PROM L shoulder   (as tolerated) [facility]           Manual posterior capsule stretch [facility]                                     Neuro Re-Ed                                                                                                        Ther Ex             UBE UBE2  3/3  L3.5 UBE 2  3 min  ea  L3.5           Pulley flexion 10sec hold x 4 min 10\" hold,  4 min           Supine wand AAROM L shoulder ER with towel roll under arm at 90 ABD 10sec x 10 10\"x  10           Sleeper IR 30sec x 3 30\"x3           Tband Rows Blue  2 x 15  BTB   2x15           Tband Extension Blue  2 x 15  BTB   2x15           Elevation in scaption 1#  2 x 15  1#   2x15           SL ER 1#  2 x 15  1#   2x15           SL ABD 1#  2 x 15  1#   2x15           Tband IR Blue  2  x15  BTB   2x15           Strap IR 10sec x 10  10\"x   10         "   Prone middle traps 0#  2 x 10  2x10           Prone lower trap 0#  2 x 10  2x10           Ball circles at wall 20cw/ccw   x 3  20x3   ea           Tband ER Green  2  x 15  BTB   2x15                        Ther Activity                                       Gait Training                                       Modalities

## 2024-02-08 ENCOUNTER — APPOINTMENT (OUTPATIENT)
Dept: PHYSICAL THERAPY | Facility: CLINIC | Age: 49
End: 2024-02-08
Payer: COMMERCIAL

## 2024-02-12 ENCOUNTER — OFFICE VISIT (OUTPATIENT)
Dept: PHYSICAL THERAPY | Facility: CLINIC | Age: 49
End: 2024-02-12
Payer: COMMERCIAL

## 2024-02-12 DIAGNOSIS — Z98.890 S/P LEFT ROTATOR CUFF REPAIR: ICD-10-CM

## 2024-02-12 DIAGNOSIS — S46.112D RUPTURE OF LEFT LONG HEAD BICEPS TENDON, SUBSEQUENT ENCOUNTER: ICD-10-CM

## 2024-02-12 DIAGNOSIS — S46.012D TRAUMATIC COMPLETE TEAR OF LEFT ROTATOR CUFF, SUBSEQUENT ENCOUNTER: Primary | ICD-10-CM

## 2024-02-12 PROCEDURE — 97110 THERAPEUTIC EXERCISES: CPT | Performed by: PHYSICAL THERAPIST

## 2024-02-12 PROCEDURE — 97140 MANUAL THERAPY 1/> REGIONS: CPT | Performed by: PHYSICAL THERAPIST

## 2024-02-12 NOTE — LETTER
2024    Bogdan Neville MD  [address]  [place] [address]    Patient: Tanya Padgett   YOB: 1975   Date of Visit: 2024     Encounter Diagnosis     ICD-10-CM    1. Traumatic complete tear of left rotator cuff, subsequent encounter  S46.012D       2. Rupture of left long head biceps tendon, subsequent encounter  S46.112D       3. S/P left rotator cuff repair  Z98.890           Dear Dr. Neville:    Thank you for your recent referral of Tanya Padgett. Please review the attached evaluation summary from Tanya's recent visit.     Please verify that you agree with the plan of care by signing the attached order.     If you have any questions or concerns, please do not hesitate to call.     I sincerely appreciate the opportunity to share in the care of one of your patients and hope to have another opportunity to work with you in the near future.       Sincerely,    Nigel Ely, PT      Referring Provider:      I certify that I have read the below Plan of Care and certify the need for these services furnished under this plan of treatment while under my care.                    Bogdan Neville MD  [address]  [place] [address]  Via Fax: [phone]          Progress Note    Today's date: 2024  Patient name: Tanya Padgett  : 1975  MRN: [id number]  Referring provider: Bogdan Neville*  Dx:   Encounter Diagnosis     ICD-10-CM    1. Traumatic complete tear of left rotator cuff, subsequent encounter  S46.012D       2. Rupture of left long head biceps tendon, subsequent encounter  S46.112D       3. S/P left rotator cuff repair  Z98.890           1/10 if any, 90% better.     Assessment   Patient is a 49 y.o. male s/p (L)  rotator cuff repair, biceps tenodesis,  distal clavicle excision and subacromial decompression 23.    Over the past month pt has made the following progress towards goals:  1) Decreased pain  2) Improved range of  motion  3) Improved strength  4) Improved self rated functional score (FOTO score improved 74 from 25 at initial visit, and 53 on 1/2/24)    Making excellent progress now 13 weeks postop.  He is in final phase of rehab with focus on  maximizing post operative strength.        Understanding of Dx/Px/POC: excellent  Goals  ST : Decrease L shoulder pain to 2/10 at worst x 1 continuous week within 6 weeks-met         PROM: Improve L shoulder PROM to 120 degrees for flexion/scaption, ER to 45 degrees, IR to 45 degrees within 6 weeks.-met         AROM Improve L shoulder AROM flexion to 90 degrees in 6-8 weeks.- met         Pt to begin submaximal shoulder isometrics by 6 weeks post op.-met         Improved FOTO self rated functional scale score (25@ IE), pt to note greater ease with dressing, return to driving within 6 weeks.-met     LT : Eliminate L shoulder pain x 1 continuous week within 12 weeks.-not yet met         PROM: Improve L shoulder PROM to 170 degrees for flexion/scaption, ER to 90 degrees, IR to 75degrees within 12 weeks.-met         AROM :  Improve L shoulder AROM to 170 degrees for flexion/scaption, ER to 90 degrees, IR to T7 level within 16 weeks. -partially met         Strength: 5/5 L shoulder stength within 16 weeks -20 weeks.-partially  met         Function: FOTO score to be at least 67 within 16 weeks.- met         Independent with home exercise program within 16-20 weeks.-not yet met        Plan  Patient would benefit from: skilled PT  Planned modality interventions: cryotherapy, TENS, thermotherapy: hydrocollator packs and ultrasound  Planned therapy interventions: ADL training, body mechanics training, functional ROM exercises, home exercise program, joint mobilization, manual therapy, neuromuscular re-education, patient education, postural training, strengthening, stretching, therapeutic activities and therapeutic exercise  Frequency: 2-3 x's per week x 2-4 weeks.       Subjective:   Please with  "progress. Rates self abut 90% better. Rarely having pain with ADLs, max 1/10 over the weekend.  Still feels some weakness and early fatigue with lifting activities/strengthening exercises.        Objective: See treatment diary below    Shoulder Comments   L shoulder:     AROM  Normal Value PROM  Normal Value Strength Normal Value   Shoulder Flexion 165 180 175 180 5 5   Shoulder Abduction 160 180 175 180 4+ *pain 5   Shoulder External Rotation 80 90 90 90 5 5   Shoulder Internal Rotation T7 T7 75 70 5 5                      Strength        Rhomboids 4+ 5       Lower Trapezius 4+ 5       Serratus Anterior 5 5             Precautions: Follow rotator cuff protocol attached to chart.    DOS: 11/13/2023    Manuals 2/1/24 2/5 2/12          PROM L shoulder   (as tolerated) [facility]          Manual posterior capsule stretch [facility]                                    Neuro Re-Ed                                                                                                        Ther Ex             UBE UBE2  3/3  L3.5 UBE 2  3 min  ea  L3.5 UBE1  3/3  L6.0          Pulley flexion 10sec hold x 4 min 10\" hold,  4 min 10sec hold x 4min          Supine wand AAROM L shoulder ER with towel roll under arm at 90 ABD 10sec x 10 10\"x  10 10sec x 5          Sleeper IR 30sec x 3 30\"x3 30sec x 3          Tband Rows Blue  2 x 15  BTB   2x15           Tband Extension Blue  2 x 15  BTB   2x15           Elevation in scaption 1#  2 x 15  1#   2x15 2#  3 x 10          SL ER 1#  2 x 15  1#   2x15 2#  2 x 10          SL ABD 1#  2 x 15  1#   2x15 2#  2 x 15          Tband IR Blue  2  x15  BTB   2x15           Strap IR 10sec x 10  10\"x   10 10sec x 10          Prone middle traps 0#  2 x 10  2x10 1#  2 x 15          Prone lower trap 0#  2 x 10  2x10 1#  2 x 15          Ball circles at wall 20cw/ccw   x 3  20x3   ea 20cw/ccw x 4          Tband ER Green  2  x 15  BTB   2x15           Wall pushups   2 x 15          Ther Activity                      "                  Gait Training                                       Modalities

## 2024-02-12 NOTE — PROGRESS NOTES
Progress Note    Today's date: 2024  Patient name: Tanya Padgett  : 1975  MRN: [id number]  Referring provider: Bogdan Neville*  Dx:   Encounter Diagnosis     ICD-10-CM    1. Traumatic complete tear of left rotator cuff, subsequent encounter  S46.012D       2. Rupture of left long head biceps tendon, subsequent encounter  S46.112D       3. S/P left rotator cuff repair  Z98.890           1/10 if any, 90% better.     Assessment   Patient is a 49 y.o. male s/p (L)  rotator cuff repair, biceps tenodesis,  distal clavicle excision and subacromial decompression 23.    Over the past month pt has made the following progress towards goals:  1) Decreased pain  2) Improved range of motion  3) Improved strength  4) Improved self rated functional score (FOTO score improved 74 from 25 at initial visit, and 53 on 24)    Making excellent progress now 13 weeks postop.  He is in final phase of rehab with focus on  maximizing post operative strength.        Understanding of Dx/Px/POC: excellent  Goals  ST : Decrease L shoulder pain to 2/10 at worst x 1 continuous week within 6 weeks-met         PROM: Improve L shoulder PROM to 120 degrees for flexion/scaption, ER to 45 degrees, IR to 45 degrees within 6 weeks.-met         AROM Improve L shoulder AROM flexion to 90 degrees in 6-8 weeks.- met         Pt to begin submaximal shoulder isometrics by 6 weeks post op.-met         Improved FOTO self rated functional scale score (25@ IE), pt to note greater ease with dressing, return to driving within 6 weeks.-met     LT : Eliminate L shoulder pain x 1 continuous week within 12 weeks.-not yet met         PROM: Improve L shoulder PROM to 170 degrees for flexion/scaption, ER to 90 degrees, IR to 75degrees within 12 weeks.-met         AROM :  Improve L shoulder AROM to 170 degrees for flexion/scaption, ER to 90 degrees, IR to T7 level within 16 weeks. -partially met         Strength: 5/5 L shoulder stength within 16  "weeks -20 weeks.-partially  met         Function: FOTO score to be at least 67 within 16 weeks.- met         Independent with home exercise program within 16-20 weeks.-not yet met        Plan  Patient would benefit from: skilled PT  Planned modality interventions: cryotherapy, TENS, thermotherapy: hydrocollator packs and ultrasound  Planned therapy interventions: ADL training, body mechanics training, functional ROM exercises, home exercise program, joint mobilization, manual therapy, neuromuscular re-education, patient education, postural training, strengthening, stretching, therapeutic activities and therapeutic exercise  Frequency: 2-3 x's per week x 2-4 weeks.       Subjective:   Please with progress. Rates self abut 90% better. Rarely having pain with ADLs, max 1/10 over the weekend.  Still feels some weakness and early fatigue with lifting activities/strengthening exercises.        Objective: See treatment diary below    Shoulder Comments   L shoulder:     AROM  Normal Value PROM  Normal Value Strength Normal Value   Shoulder Flexion 165 180 175 180 5 5   Shoulder Abduction 160 180 175 180 4+ *pain 5   Shoulder External Rotation 80 90 90 90 5 5   Shoulder Internal Rotation T7 T7 75 70 5 5                      Strength        Rhomboids 4+ 5       Lower Trapezius 4+ 5       Serratus Anterior 5 5             Precautions: Follow rotator cuff protocol attached to chart.    DOS: 11/13/2023    Manuals 2/1/24 2/5 2/12          PROM L shoulder   (as tolerated) [facility]          Manual posterior capsule stretch [facility]                                    Neuro Re-Ed                                                                                                        Ther Ex             UBE UBE2  3/3  L3.5 UBE 2  3 min  ea  L3.5 UBE1  3/3  L6.0          Pulley flexion 10sec hold x 4 min 10\" hold,  4 min 10sec hold x 4min          Supine wand AAROM L shoulder ER with towel roll under arm at 90 ABD 10sec x 10 10\"x  10 " "10sec x 5          Sleeper IR 30sec x 3 30\"x3 30sec x 3          Tband Rows Blue  2 x 15  BTB   2x15           Tband Extension Blue  2 x 15  BTB   2x15           Elevation in scaption 1#  2 x 15  1#   2x15 2#  3 x 10          SL ER 1#  2 x 15  1#   2x15 2#  2 x 10          SL ABD 1#  2 x 15  1#   2x15 2#  2 x 15          Tband IR Blue  2  x15  BTB   2x15           Strap IR 10sec x 10  10\"x   10 10sec x 10          Prone middle traps 0#  2 x 10  2x10 1#  2 x 15          Prone lower trap 0#  2 x 10  2x10 1#  2 x 15          Ball circles at wall 20cw/ccw   x 3  20x3   ea 20cw/ccw x 4          Tband ER Green  2  x 15  BTB   2x15           Wall pushups   2 x 15          Ther Activity                                       Gait Training                                       Modalities                                              "

## 2024-02-15 ENCOUNTER — OFFICE VISIT (OUTPATIENT)
Dept: PHYSICAL THERAPY | Facility: CLINIC | Age: 49
End: 2024-02-15
Payer: COMMERCIAL

## 2024-02-15 DIAGNOSIS — S46.112D RUPTURE OF LEFT LONG HEAD BICEPS TENDON, SUBSEQUENT ENCOUNTER: ICD-10-CM

## 2024-02-15 DIAGNOSIS — Z98.890 S/P LEFT ROTATOR CUFF REPAIR: ICD-10-CM

## 2024-02-15 DIAGNOSIS — S46.012D TRAUMATIC COMPLETE TEAR OF LEFT ROTATOR CUFF, SUBSEQUENT ENCOUNTER: Primary | ICD-10-CM

## 2024-02-15 PROCEDURE — 97110 THERAPEUTIC EXERCISES: CPT | Performed by: PHYSICAL THERAPIST

## 2024-02-15 PROCEDURE — 97140 MANUAL THERAPY 1/> REGIONS: CPT | Performed by: PHYSICAL THERAPIST

## 2024-02-15 NOTE — PROGRESS NOTES
"Daily Note    Today's date: 2024  Patient name: Tanya Padgett  : 1975  MRN: [id number]  Referring provider: Bogdan Neville*  Dx:   Encounter Diagnosis     ICD-10-CM    1. Traumatic complete tear of left rotator cuff, subsequent encounter  S46.012D       2. Rupture of left long head biceps tendon, subsequent encounter  S46.112D       3. S/P left rotator cuff repair  Z98.890         Subjective:  Since last visit: Followed up with Dr. Neville. MD got it touch with me, he is pleased with Tanya's progress and so is Tanya. Tanya reports he did feel some mild pain with snowblowing earlier this week.                         Objective:  Rx as per treatment diary below.  Progressed functional UE strengthening program.  Issued thicker/purple theraband for use with HEP      Assessment: Tolerated treatment well.                          Functional limitations remain as follows.: Decreaed lifting capacity, some unresolved pain with household chores  Patient would benefit from continued course of skilled physical therapy to address impairments in an effort to improve function.      Plan: Continue as per plan of care.Will decrease frequency of P.T. and focus more on HEP over the next month leading up to discharge.    So        Precautions: Follow rotator cuff protocol attached to chart.    DOS: 2023    Manuals 24 2/5 2/12 2/15         PROM L shoulder   (as tolerated) [facility]         Manual posterior capsule stretch [facility]                                   Neuro Re-Ed                                                                                                        Ther Ex             UBE UBE2  3/3  L3.5 UBE 2  3 min  ea  L3.5 UBE1  3/3  L6.0 UBE2  3/3  L3.0         Pulley flexion 10sec hold x 4 min 10\" hold,  4 min 10sec hold x 4min 10sec hold x 4 min         Supine wand AAROM L shoulder ER with towel roll under arm at 90 ABD 10sec x 10 10\"x  10 10sec x 5 10sec x 5       " "  Sleeper IR 30sec x 3 30\"x3 30sec x 3 30sec x 3         Tband Rows Blue  2 x 15  BTB   2x15           Tband Extension Blue  2 x 15  BTB   2x15           Elevation in scaption 1#  2 x 15  1#   2x15 2#  3 x 10 2#  3 x 10         SL ER 1#  2 x 15  1#   2x15 2#  2 x 10 2#  2 x 15         SL ABD 1#  2 x 15  1#   2x15 2#  2 x 15 2#  2 x 15         Tband IR Blue  2  x15  BTB   2x15           Strap IR 10sec x 10  10\"x   10 10sec x 10 10sec x 10         Prone middle traps 0#  2 x 10  2x10 1#  2 x 15 1#  2 x 15         Prone lower trap 0#  2 x 10  2x10 1#  2 x 15 1#  2 x 5         Ball circles at wall 20cw/ccw   x 3  20x3   ea 20cw/ccw x 4 20cw/ccw x 4         Tband ER Green  2  x 15  BTB   2x15           Wall pushups   2 x 15 2 x 15         Supine triceps press    5#  2 x 15         Supine chest press    5#  2 x 15         Med ball up and out.    2.2#  2 x 10         Ther Activity                                       Gait Training                                       Modalities                                              "

## 2024-02-19 ENCOUNTER — OFFICE VISIT (OUTPATIENT)
Dept: PHYSICAL THERAPY | Facility: CLINIC | Age: 49
End: 2024-02-19
Payer: COMMERCIAL

## 2024-02-19 DIAGNOSIS — Z98.890 S/P LEFT ROTATOR CUFF REPAIR: ICD-10-CM

## 2024-02-19 DIAGNOSIS — S46.112D RUPTURE OF LEFT LONG HEAD BICEPS TENDON, SUBSEQUENT ENCOUNTER: ICD-10-CM

## 2024-02-19 DIAGNOSIS — S46.012D TRAUMATIC COMPLETE TEAR OF LEFT ROTATOR CUFF, SUBSEQUENT ENCOUNTER: Primary | ICD-10-CM

## 2024-02-19 PROCEDURE — 97110 THERAPEUTIC EXERCISES: CPT | Performed by: PHYSICAL THERAPIST

## 2024-02-19 PROCEDURE — 97140 MANUAL THERAPY 1/> REGIONS: CPT | Performed by: PHYSICAL THERAPIST

## 2024-02-19 NOTE — PROGRESS NOTES
"Daily Note    Today's date: 2024  Patient name: Tanya Padgett  : 1975  MRN: [id number]  Referring provider: Bogdan Neville*  Dx:   Encounter Diagnosis     ICD-10-CM    1. Traumatic complete tear of left rotator cuff, subsequent encounter  S46.012D       2. Rupture of left long head biceps tendon, subsequent encounter  S46.112D       3. S/P left rotator cuff repair  Z98.890           Subjective:  L shoulder felt good most of the time over the weekend. Noting some low back pain from snow blowing.                         Objective:  Rx as per treatment diary below.  Progressed UE strengthening program in plane of pure abduction against gravity.    Assessment: Fatigued > pain progressions. Still making good progress.                          Functional limitations remain as follows.: Decreaed lifting capacity, some unresolved pain with household chores  Patient would benefit from continued course of skilled physical therapy to address impairments in an effort to improve function.      Plan: Continue as per plan of care.Will decrease frequency of P.T. and focus more on HEP over the next month leading up to discharge.    Stop Time: 1700   Precautions: Follow rotator cuff protocol attached to chart.    DOS: 2023    Manuals 24 2/5 2/12 2/15 2/19        PROM L shoulder   (as tolerated) [facility]        Manual posterior capsule stretch [facility]                                  Neuro Re-Ed                                                                                                        Ther Ex             UBE UBE2  3/3  L3.5 UBE 2  3 min  ea  L3.5 UBE1  3/3  L6.0 UBE2  3/3  L3.0 UBE!  3.3  L5.0        Pulley flexion 10sec hold x 4 min 10\" hold,  4 min 10sec hold x 4min 10sec hold x 4 min 10sec hold x 4 min        Supine wand AAROM L shoulder ER with towel roll under arm at 90 ABD 10sec x 10 10\"x  10 10sec x 5 10sec x 5 10sec x 5        Sleeper IR 30sec x 3 30\"x3 30sec x 3 30sec " "x 3 30sec x 3        Tband Rows Blue  2 x 15  BTB   2x15           Tband Extension Blue  2 x 15  BTB   2x15           Elevation in scaption 1#  2 x 15  1#   2x15 2#  3 x 10 2#  3 x 10 2#  2 x 10        ABD to 90 in standing     2#  2 x 10        SL ER 1#  2 x 15  1#   2x15 2#  2 x 10 2#  2 x 15 2#  2 x 15        SL ABD 1#  2 x 15  1#   2x15 2#  2 x 15 2#  2 x 15 2#  2 x 15        Tband IR Blue  2  x15  BTB   2x15           Strap IR 10sec x 10  10\"x   10 10sec x 10 10sec x 10 10sec x 5        Prone middle traps 0#  2 x 10  2x10 1#  2 x 15 1#  2 x 15 1#  2 x 15        Prone lower trap 0#  2 x 10  2x10 1#  2 x 15 1#  2 x 5 1#  2 x 15        Ball circles at wall 20cw/ccw   x 3  20x3   ea 20cw/ccw x 4 20cw/ccw x 4 20cw/ccw  x4        Tband ER Green  2  x 15  BTB   2x15           Wall pushups   2 x 15 2 x 15 2 x 20 BOSU       Supine triceps press    5#  2 x 15 5#  2 x 15        Supine chest press    5#  2 x 15 5#  2 x 15        Med ball up and out.    2.2#  2 x 10         Manual resisted PN D2 flexion/extension     2 x 10        AROM IR     10sec x 5        Ther Activity                                       Gait Training                                       Modalities                                              "

## 2024-02-22 ENCOUNTER — OFFICE VISIT (OUTPATIENT)
Dept: PHYSICAL THERAPY | Facility: CLINIC | Age: 49
End: 2024-02-22
Payer: COMMERCIAL

## 2024-02-22 DIAGNOSIS — S46.112D RUPTURE OF LEFT LONG HEAD BICEPS TENDON, SUBSEQUENT ENCOUNTER: ICD-10-CM

## 2024-02-22 DIAGNOSIS — S46.012D TRAUMATIC COMPLETE TEAR OF LEFT ROTATOR CUFF, SUBSEQUENT ENCOUNTER: Primary | ICD-10-CM

## 2024-02-22 DIAGNOSIS — Z98.890 S/P LEFT ROTATOR CUFF REPAIR: ICD-10-CM

## 2024-02-22 PROCEDURE — 97110 THERAPEUTIC EXERCISES: CPT

## 2024-02-22 PROCEDURE — 97140 MANUAL THERAPY 1/> REGIONS: CPT

## 2024-02-22 NOTE — PROGRESS NOTES
"Daily Note    Today's date: 2024  Patient name: Tanya Padgett  : 1975  MRN: [id number]  Referring provider: Bogdan Neville*  Dx:   Encounter Diagnosis     ICD-10-CM    1. Traumatic complete tear of left rotator cuff, subsequent encounter  S46.012D       2. Rupture of left long head biceps tendon, subsequent encounter  S46.112D       3. S/P left rotator cuff repair  Z98.890                 Subjective: Feels L shoulder is making good progress.                       Objective: Rx as per treatment diary below.      Assessment: PROM improving and near full ROM with no reported pain and minimal tissue restrictions. BOSU added to wall pushups with increased fatigue > pain. Skilled PT remains appropriate at this time to build more functional strength and endurance especially in OH ranges.      Plan: Continue as per plan of care.  Will decrease frequency of PT and focus more on HEP over the next month leading up to discharge.          Precautions: Follow rotator cuff protocol attached to chart.    DOS: 2023    Manuals 2/1/24 2/5 2/12 2/15 2/19 2/22       PROM L shoulder   (as tolerated) [facility]       Manual posterior capsule stretch [facility]                                 Neuro Re-Ed                                                                                                        Ther Ex             UBE UBE2  3/3  L3.5 UBE 2  3 min  ea  L3.5 UBE1  3/3  L6.0 UBE2  3/3  L3.0 UBE!  3.3  L5.0 UBE 2  3 min ea  L3       Pulley flexion 10sec hold x 4 min 10\" hold,  4 min 10sec hold x 4min 10sec hold x 4 min 10sec hold x 4 min 10\" hold,  4 min       Supine wand AAROM L shoulder ER with towel roll under arm at 90 ABD 10sec x 10 10\"x  10 10sec x 5 10sec x 5 10sec x 5 10\"x5       Sleeper IR 30sec x 3 30\"x3 30sec x 3 30sec x 3 30sec x 3 30\"x3       Tband Rows Blue  2 x 15  BTB   2x15           Tband Extension Blue  2 x 15  BTB   2x15           Elevation in scaption 1#  2 x 15  1#   " "2x15 2#  3 x 10 2#  3 x 10 2#  2 x 10  2#   2x10       ABD to 90 in standing     2#  2 x 10  2#   2x10       SL ER 1#  2 x 15  1#   2x15 2#  2 x 10 2#  2 x 15 2#  2 x 15  2#   2x15       SL ABD 1#  2 x 15  1#   2x15 2#  2 x 15 2#  2 x 15 2#  2 x 15  2#   2x15       Tband IR Blue  2  x15  BTB   2x15           Strap IR 10sec x 10  10\"x   10 10sec x 10 10sec x 10 10sec x 5  10\"x5       Prone middle traps 0#  2 x 10  2x10 1#  2 x 15 1#  2 x 15 1#  2 x 15  1#   2x15       Prone lower trap 0#  2 x 10  2x10 1#  2 x 15 1#  2 x 5 1#  2 x 15  1#   2x15       Ball circles at wall 20cw/ccw   x 3  20x3   ea 20cw/ccw x 4 20cw/ccw x 4 20cw/ccw  x4  20x4   ea       Tband ER Green  2  x 15  BTB   2x15           Wall pushups   2 x 15 2 x 15 2 x 20  BOSU   2x10       Supine triceps press    5#  2 x 15 5#  2 x 15  5#   2x15       Supine chest press    5#  2 x 15 5#  2 x 15  5#   2x15       Med ball up and out    2.2#  2 x 10         Manual resisted PNF D2 flexion/  extension     2 x 10  NV       AROM IR     10sec x 5  10\"x5                    Ther Activity                                       Gait Training                                       Modalities                                            "

## 2024-02-26 ENCOUNTER — OFFICE VISIT (OUTPATIENT)
Dept: PHYSICAL THERAPY | Facility: CLINIC | Age: 49
End: 2024-02-26
Payer: COMMERCIAL

## 2024-02-26 DIAGNOSIS — S46.112D RUPTURE OF LEFT LONG HEAD BICEPS TENDON, SUBSEQUENT ENCOUNTER: ICD-10-CM

## 2024-02-26 DIAGNOSIS — Z98.890 S/P LEFT ROTATOR CUFF REPAIR: ICD-10-CM

## 2024-02-26 DIAGNOSIS — S46.012D TRAUMATIC COMPLETE TEAR OF LEFT ROTATOR CUFF, SUBSEQUENT ENCOUNTER: Primary | ICD-10-CM

## 2024-02-26 PROCEDURE — 97110 THERAPEUTIC EXERCISES: CPT | Performed by: PHYSICAL THERAPIST

## 2024-02-26 PROCEDURE — 97140 MANUAL THERAPY 1/> REGIONS: CPT | Performed by: PHYSICAL THERAPIST

## 2024-02-26 NOTE — PROGRESS NOTES
"Daily Note     Today's date: 2024  Patient name: Tanya Padgett  : 1975  MRN: [id number]  Referring provider: Bogdan Neville*  Dx:   Encounter Diagnosis     ICD-10-CM    1. Traumatic complete tear of left rotator cuff, subsequent encounter  S46.012D       2. Rupture of left long head biceps tendon, subsequent encounter  S46.112D       3. S/P left rotator cuff repair  Z98.890                      Subjective: Feeling pretty good. Had some transient AM discomfort after sleeping awkwardly over the weekend. Feels fatigue with exercise.      Objective: See treatment diary below.Progressed UE strengthening program.      Assessment: Tolerated treatment well. Patient is making good progress towards maximizing functional strength.      Plan: Continue per plan of care. 1x/wk x 3-4 weeks. Add in Tband PNF D2 flexion to HEP next visit as long as still feeling good.     Precautions: Follow rotator cuff protocol attached to chart.    DOS: 2023    Manuals 2/1/24 2/5 2/12 2/15 2/19 2/22 2/26      PROM L shoulder   (as tolerated) [facility]      Manual posterior capsule stretch [facility]                                Neuro Re-Ed                                                                                                        Ther Ex             UBE UBE2  3/3  L3.5 UBE 2  3 min  ea  L3.5 UBE1  3/3  L6.0 UBE2  3/3  L3.0 UBE!  3.3  L5.0 UBE 2  3 min ea  L3 UBE2  3/3  L3      Pulley flexion 10sec hold x 4 min 10\" hold,  4 min 10sec hold x 4min 10sec hold x 4 min 10sec hold x 4 min 10\" hold,  4 min 10sec hold x 3 min      Supine wand AAROM L shoulder ER with towel roll under arm at 90 ABD 10sec x 10 10\"x  10 10sec x 5 10sec x 5 10sec x 5 10\"x5 10sec x 5      Sleeper IR 30sec x 3 30\"x3 30sec x 3 30sec x 3 30sec x 3 30\"x3 30sec x 3      Tband Rows Blue  2 x 15  BTB   2x15           Tband Extension Blue  2 x 15  BTB   2x15           Elevation in scaption 1#  2 x 15  1#   2x15 2#  3 x 10 " "2#  3 x 10 2#  2 x 10  2#   2x10 2#  2 x 10      ABD to 90 in standing     2#  2 x 10  2#   2x10 2#  2 x 10      SL ER 1#  2 x 15  1#   2x15 2#  2 x 10 2#  2 x 15 2#  2 x 15  2#   2x15 2#  2 x 15      SL ABD 1#  2 x 15  1#   2x15 2#  2 x 15 2#  2 x 15 2#  2 x 15  2#   2x15 2#  2  15      Tband IR Blue  2  x15  BTB   2x15           Strap IR 10sec x 10  10\"x   10 10sec x 10 10sec x 10 10sec x 5  10\"x5 10sec x 4      Prone middle traps 0#  2 x 10  2x10 1#  2 x 15 1#  2 x 15 1#  2 x 15  1#   2x15 2#  2 x 10      Prone lower trap 0#  2 x 10  2x10 1#  2 x 15 1#  2 x 5 1#  2 x 15  1#   2x15 1#  2 x 15      Ball circles at wall 20cw/ccw   x 3  20x3   ea 20cw/ccw x 4 20cw/ccw x 4 20cw/ccw  x4  20x4   ea 20cw/ccwx  4      Tband ER Green  2  x 15  BTB   2x15           Wall pushups   2 x 15 2 x 15 2 x 20  BOSU   2x10 BOSU  2 x 15      Supine triceps press    5#  2 x 15 5#  2 x 15  5#   2x15 6#  2 x 10      Supine chest press    5#  2 x 15 5#  2 x 15  5#   2x15 6#  2 x 10      Med ball up and out    2.2#  2 x 10   4.4#  3  10      Manual resisted PNF D2 flexion/  extension     2 x 10  NV 2 x 10      AROM IR     10sec x 5  10\"x5 10sec x 5      Tband PNF D2 flexion       NV [place]     Ther Activity                                       Gait Training                                       Modalities                                              "

## 2024-02-29 ENCOUNTER — APPOINTMENT (OUTPATIENT)
Dept: PHYSICAL THERAPY | Facility: CLINIC | Age: 49
End: 2024-02-29
Payer: COMMERCIAL

## 2024-03-07 ENCOUNTER — OFFICE VISIT (OUTPATIENT)
Dept: PHYSICAL THERAPY | Facility: CLINIC | Age: 49
End: 2024-03-07
Payer: COMMERCIAL

## 2024-03-07 DIAGNOSIS — S46.012D TRAUMATIC COMPLETE TEAR OF LEFT ROTATOR CUFF, SUBSEQUENT ENCOUNTER: Primary | ICD-10-CM

## 2024-03-07 DIAGNOSIS — S46.112D RUPTURE OF LEFT LONG HEAD BICEPS TENDON, SUBSEQUENT ENCOUNTER: ICD-10-CM

## 2024-03-07 DIAGNOSIS — Z98.890 S/P LEFT ROTATOR CUFF REPAIR: ICD-10-CM

## 2024-03-07 PROCEDURE — 97140 MANUAL THERAPY 1/> REGIONS: CPT

## 2024-03-07 PROCEDURE — 97110 THERAPEUTIC EXERCISES: CPT

## 2024-03-07 NOTE — PROGRESS NOTES
"Daily Note     Today's date: 3/7/2024  Patient name: Tanya Padgett  : 1975  MRN: [id number]  Referring provider: Bogdan Neville*  Dx:   Encounter Diagnosis     ICD-10-CM    1. Traumatic complete tear of left rotator cuff, subsequent encounter  S46.012D       2. Rupture of left long head biceps tendon, subsequent encounter  S46.112D       3. S/P left rotator cuff repair  Z98.890                      Subjective: Patient did well while away and was able to maintain some compliance with his HEP.      Objective: See treatment diary below.      Assessment: Mild rotational tightness that responded well to manual stretching. Muscle fatigue as expected with current strengthening program with no pain reported throughout. PNF D2 flex added to HEP with OTB. Continues to be making good functional progress at this time.      Plan: Continue per plan of care. 1x/wk x 3-4 weeks.         Precautions: Follow rotator cuff protocol attached to chart.    DOS: 2023    Manuals 2/1/24 2/5 2/12 2/15 2/19 2/22 2/26 3/7     PROM L shoulder   (as tolerated) [facility]     Manual posterior capsule stretch [facility]                               Neuro Re-Ed                                                                                                        Ther Ex             UBE UBE2  3/3  L3.5 UBE 2  3 min  ea  L3.5 UBE1  3/3  L6.0 UBE2  3/3  L3.0 UBE!  3.3  L5.0 UBE 2  3 min ea  L3 UBE2  3/3  L3 UBE 2  3 min ea  L3     Pulley flexion 10sec hold x 4 min 10\" hold,  4 min 10sec hold x 4min 10sec hold x 4 min 10sec hold x 4 min 10\" hold,  4 min 10sec hold x 3 min 10\" hold, 3 min     Supine wand AAROM L shoulder ER with towel roll under arm at 90 ABD 10sec x 10 10\"x  10 10sec x 5 10sec x 5 10sec x 5 10\"x5 10sec x 5 10\"x5     Sleeper IR 30sec x 3 30\"x3 30sec x 3 30sec x 3 30sec x 3 30\"x3 30sec x 3 30\"x3     Tband Rows Blue  2 x 15  BTB   2x15           Tband Extension Blue  2 x 15  BTB   2x15      " "     Elevation in scaption 1#  2 x 15  1#   2x15 2#  3 x 10 2#  3 x 10 2#  2 x 10  2#   2x10 2#  2 x 10  2#   2x10     ABD to 90 in standing     2#  2 x 10  2#   2x10 2#  2 x 10  2#   2x10     SL ER 1#  2 x 15  1#   2x15 2#  2 x 10 2#  2 x 15 2#  2 x 15  2#   2x15 2#  2 x 15  2#   2x15     SL ABD 1#  2 x 15  1#   2x15 2#  2 x 15 2#  2 x 15 2#  2 x 15  2#   2x15 2#  2  15  2#   2x15     Tband IR Blue  2  x15  BTB   2x15           Strap IR 10sec x 10  10\"x   10 10sec x 10 10sec x 10 10sec x 5  10\"x5 10sec x 4  10\"x5     Prone middle traps 0#  2 x 10  2x10 1#  2 x 15 1#  2 x 15 1#  2 x 15  1#   2x15 2#  2 x 10  2#   2x10     Prone lower trap 0#  2 x 10  2x10 1#  2 x 15 1#  2 x 5 1#  2 x 15  1#   2x15 1#  2 x 15  1#   2x15     Ball circles at wall 20cw/ccw   x 3  20x3   ea 20cw/ccw x 4 20cw/ccw x 4 20cw/ccw  x4  20x4   ea 20cw/ccwx  4  20x4   ea     Tband ER Green  2  x 15  BTB   2x15           Wall pushups   2 x 15 2 x 15 2 x 20  BOSU   2x10 BOSU  2 x 15  BOSU   2x15     Supine triceps press    5#  2 x 15 5#  2 x 15  5#   2x15 6#  2 x 10  6#   2x10     Supine chest press    5#  2 x 15 5#  2 x 15  5#   2x15 6#  2 x 10  6#   2x10     Med ball up and out    2.2#  2 x 10   4.4#  3  10  YMB   4.4#   3x10     Manual resisted PNF D2 flexion/  extension     2 x 10  NV 2 x 10  2x10     AROM IR     10sec x 5  10\"x5 10sec x 5  10\"x5      Tband PNF D2 flexion       NV  OTB   2x10                  Ther Activity                                       Gait Training                                       Modalities                                            "

## 2024-03-11 ENCOUNTER — OFFICE VISIT (OUTPATIENT)
Dept: PHYSICAL THERAPY | Facility: CLINIC | Age: 49
End: 2024-03-11
Payer: COMMERCIAL

## 2024-03-11 DIAGNOSIS — S46.012D TRAUMATIC COMPLETE TEAR OF LEFT ROTATOR CUFF, SUBSEQUENT ENCOUNTER: Primary | ICD-10-CM

## 2024-03-11 DIAGNOSIS — Z98.890 S/P LEFT ROTATOR CUFF REPAIR: ICD-10-CM

## 2024-03-11 DIAGNOSIS — S46.112D RUPTURE OF LEFT LONG HEAD BICEPS TENDON, SUBSEQUENT ENCOUNTER: ICD-10-CM

## 2024-03-11 PROCEDURE — 97110 THERAPEUTIC EXERCISES: CPT | Performed by: PHYSICAL THERAPIST

## 2024-03-11 PROCEDURE — 97140 MANUAL THERAPY 1/> REGIONS: CPT | Performed by: PHYSICAL THERAPIST

## 2024-03-11 NOTE — PROGRESS NOTES
"Daily Note     Today's date: 3/11/2024  Patient name: Tanya Padgett  : 1975  MRN: [id number]  Referring provider: Bogdan Neville*  Dx:   Encounter Diagnosis     ICD-10-CM    1. Traumatic complete tear of left rotator cuff, subsequent encounter  S46.012D       2. Rupture of left long head biceps tendon, subsequent encounter  S46.112D       3. S/P left rotator cuff repair  Z98.890                      Subjective: Denies L shoulder pain over the weekend. Feels fatigued in L arm > R at times.      Objective: See treatment diary below.  Progressed UE strengthening program.  Has full L shoulder PROM with mild end range pains.  Has full L shoulder AROM with tightness noted at end range IR.      Assessment: Tolerated progressions well. Nearing discharge working on maximizing post op functional strength and motion.      Plan: Continue per plan of care. Likely 1-2 more visits. Will also refine HEP for discharge     Precautions: Follow rotator cuff protocol attached to chart.    DOS: 2023    Manuals 3/ 2/5 2/12 2/15 2/19 2/22 2/26 3/7 3/11                                           PROM L shoulder   (as tolerated) [facility]    Manual posterior capsule stretch [facility]                              Neuro Re-Ed                                                                                                        Ther Ex             UBE UBE2  3/3  L3.5 UBE 2  3 min  ea  L3.5 UBE1  3/3  L6.0 UBE2  3/3  L3.0 UBE!  3.3  L5.0 UBE 2  3 min ea  L3 UBE2  3/3  L3 UBE 2  3 min ea  L3 UBE2  3/3  L3    Pulley flexion 10sec hold x 4 min 10\" hold,  4 min 10sec hold x 4min 10sec hold x 4 min 10sec hold x 4 min 10\" hold,  4 min 10sec hold x 3 min 10\" hold, 3 min 10sec hold x 3 min    Supine wand AAROM L shoulder ER with towel roll under arm at 90 ABD 10sec x 10 10\"x  10 10sec x 5 10sec x 5 10sec x 5 10\"x5 10sec x 5 10\"x5 10sec x 5    Sleeper IR 30sec x 3 30\"x3 30sec x 3 30sec x 3 30sec x " ----- Message from Michaela Rosa MD sent at 9/19/2019  9:45 AM CDT -----  CBC and lead level are normal. Triage to notify family of results.   "3 30\"x3 30sec x 3 30\"x3 30sec  3    Tband Rows Blue  2 x 15  BTB   2x15           Tband Extension Blue  2 x 15  BTB   2x15           Elevation in scaption 1#  2 x 15  1#   2x15 2#  3 x 10 2#  3 x 10 2#  2 x 10  2#   2x10 2#  2 x 10  2#   2x10 3#  2 x 10    ABD to 90 in standing     2#  2 x 10  2#   2x10 2#  2 x 10  2#   2x10 2#  2 x 10    SL ER 1#  2 x 15  1#   2x15 2#  2 x 10 2#  2 x 15 2#  2 x 15  2#   2x15 2#  2 x 15  2#   2x15 2#  '2 x 15    SL ABD 1#  2 x 15  1#   2x15 2#  2 x 15 2#  2 x 15 2#  2 x 15  2#   2x15 2#  2  15  2#   2x15 D/C    Tband IR Blue  2  x15  BTB   2x15           Strap IR 10sec x 10  10\"x   10 10sec x 10 10sec x 10 10sec x 5  10\"x5 10sec x 4  10\"x5 10sec x 5    Prone middle traps 0#  2 x 10  2x10 1#  2 x 15 1#  2 x 15 1#  2 x 15  1#   2x15 2#  2 x 10  2#   2x10 2#  2 x 15    Prone lower trap 0#  2 x 10  2x10 1#  2 x 15 1#  2 x 5 1#  2 x 15  1#   2x15 1#  2 x 15  1#   2x15 2#  2 x 10    Ball circles at wall 20cw/ccw   x 3  20x3   ea 20cw/ccw x 4 20cw/ccw x 4 20cw/ccw  x4  20x4   ea 20cw/ccwx  4  20x4   ea 1#  20cw/ccw x 4    Tband ER Green  2  x 15  BTB   2x15           Wall pushups   2 x 15 2 x 15 2 x 20  BOSU   2x10 BOSU  2 x 15  BOSU   2x15 BOSU  2 x 15    Supine triceps press    5#  2 x 15 5#  2 x 15  5#   2x15 6#  2 x 10  6#   2x10 8#  2 x 10    Supine chest press    5#  2 x 15 5#  2 x 15  5#   2x15 6#  2 x 10  6#   2x10 8#  2 x 10    Med ball up and out    2.2#  2 x 10   4.4#  3  10  YMB   4.4#   3x10 4.4#  3 x 10    Manual resisted PNF D2 flexion/  extension     2 x 10  NV 2 x 10  2x10 3 x 10    AROM IR     10sec x 5  10\"x5 10sec x 5  10\"x5  10sec x 5    Tband PNF D2 flexion       NV  OTB   2x10 Orange 2 x 10                 Ther Activity                                       Gait Training                                       Modalities                                              "

## 2024-03-18 ENCOUNTER — OFFICE VISIT (OUTPATIENT)
Dept: PHYSICAL THERAPY | Facility: CLINIC | Age: 49
End: 2024-03-18
Payer: COMMERCIAL

## 2024-03-18 DIAGNOSIS — S46.012D TRAUMATIC COMPLETE TEAR OF LEFT ROTATOR CUFF, SUBSEQUENT ENCOUNTER: Primary | ICD-10-CM

## 2024-03-18 DIAGNOSIS — S46.112D RUPTURE OF LEFT LONG HEAD BICEPS TENDON, SUBSEQUENT ENCOUNTER: ICD-10-CM

## 2024-03-18 DIAGNOSIS — Z98.890 S/P LEFT ROTATOR CUFF REPAIR: ICD-10-CM

## 2024-03-18 PROCEDURE — 97140 MANUAL THERAPY 1/> REGIONS: CPT | Performed by: PHYSICAL THERAPIST

## 2024-03-18 PROCEDURE — 97110 THERAPEUTIC EXERCISES: CPT | Performed by: PHYSICAL THERAPIST

## 2024-03-18 NOTE — PROGRESS NOTES
P.T. Discharge    Today's date: 3/19/2024  Patient name: Tanya Padgett  : 1975  MRN: [id number]  Referring provider: Bogdan Neville*  Dx:   Encounter Diagnosis     ICD-10-CM    1. Traumatic complete tear of left rotator cuff, subsequent encounter  S46.012D       2. Rupture of left long head biceps tendon, subsequent encounter  S46.112D       3. S/P left rotator cuff repair  Z98.890               Subjective: Had 1/10 L shoulder pain over the weekend lifting RV battery. This has since resolved.         Objective: See treatment diary below.    L shoulder:       AROM  Normal Value PROM  Normal Value Strength Normal Value   Shoulder Flexion 175 180 175 180 5 5   Shoulder Abduction 175 180 175 180 5 5   Shoulder External Rotation 90 90 90 90 5 5   Shoulder Internal Rotation T7 T7 75 70 5 5                                     Strength             Rhomboids 5 5           Lower Trapezius 5 5           Serratus Anterior 5 5               Tried a pushups from ground, but unable  to do, still too weak.     Assessment: MMT strength is full. Still does not have full functional strength.     Plan: Continue per plan of care. Focus on HEP. Two weeks follow up.         S   Precautions: Follow rotator cuff protocol attached to chart.    DOS: 2023    Manuals 3/18/24                                                   PROM L shoulder   (as tolerated) RG            Manual posterior capsule stretch RG                                      Neuro Re-Ed                                                                                                        Ther Ex             UBE UBE2  3/3  L3.5            Pulley flexion 10sec hold x 4 min            Sleeper IR 30sec x 3                                      Elevation in scaption 3#  2 x 10            ABD to 90 in standing 2#  2 x 15            SL ER 3#  2 x 10            Tband IR Blue  2  x15            Strap IR 10sec x 10            Prone middle traps 2#  2 x 10             Prone lower trap 2#  2 x 10            Ball circles at wall 20cw/ccw   x 3            Tband ER Green  2  x 15            BOSU Wall pushups 2 x 15            Supine triceps press 10#  2 x 10            Supine chest press 10#  2 x 10            Med ball up and out 5# DB  2 x 10                         AROM IR 10sec x 10            Tband PNF D2 flexion Orange  2 x 10                         Ther Activity                                       Gait Training                                       Modalities

## 2024-04-01 ENCOUNTER — APPOINTMENT (OUTPATIENT)
Dept: PHYSICAL THERAPY | Facility: CLINIC | Age: 49
End: 2024-04-01
Payer: COMMERCIAL

## 2024-04-11 ENCOUNTER — OFFICE VISIT (OUTPATIENT)
Dept: PHYSICAL THERAPY | Facility: CLINIC | Age: 49
End: 2024-04-11
Payer: COMMERCIAL

## 2024-04-11 DIAGNOSIS — Z98.890 S/P LEFT ROTATOR CUFF REPAIR: ICD-10-CM

## 2024-04-11 DIAGNOSIS — S46.112D RUPTURE OF LEFT LONG HEAD BICEPS TENDON, SUBSEQUENT ENCOUNTER: ICD-10-CM

## 2024-04-11 DIAGNOSIS — S46.012D TRAUMATIC COMPLETE TEAR OF LEFT ROTATOR CUFF, SUBSEQUENT ENCOUNTER: Primary | ICD-10-CM

## 2024-04-11 PROCEDURE — 97110 THERAPEUTIC EXERCISES: CPT | Performed by: PHYSICAL THERAPIST

## 2024-04-11 PROCEDURE — 97140 MANUAL THERAPY 1/> REGIONS: CPT | Performed by: PHYSICAL THERAPIST

## 2024-04-11 NOTE — LETTER
2024    Bogdan Neville MD  [address] [place] [address]    Patient: Tanya Padgett   YOB: 1975   Date of Visit: 2024     Encounter Diagnosis     ICD-10-CM    1. Traumatic complete tear of left rotator cuff, subsequent encounter  S46.012D       2. Rupture of left long head biceps tendon, subsequent encounter  S46.112D       3. S/P left rotator cuff repair  Z98.890           Dear Dr. Neville:    Thank you for your recent referral of Tanya Padgett. Please review the attached evaluation summary from Tanya's recent visit.     Please verify that you agree with the plan of care by signing the attached order.     If you have any questions or concerns, please do not hesitate to call.     I sincerely appreciate the opportunity to share in the care of one of your patients and hope to have another opportunity to work with you in the near future.       Sincerely,    Nigel Ely, PT      Referring Provider:      I certify that I have read the below Plan of Care and certify the need for these services furnished under this plan of treatment while under my care.                    Bogdan Neville MD  [address] [place] [address]  Via Fax: [phone]          Progress Note     Today's date: 2024  Patient name: Tanya Padgett  : 1975  MRN: [id number]  Referring provider: Bogdan Neville*  Dx:   Encounter Diagnosis     ICD-10-CM    1. Traumatic complete tear of left rotator cuff, subsequent encounter  S46.012D       2. Rupture of left long head biceps tendon, subsequent encounter  S46.112D       3. S/P left rotator cuff repair  Z98.890              Stop Time: 1730            Assessment   Patient is a 49 y.o. male s/p (L)  rotator cuff repair, biceps tenodesis,  distal clavicle excision and subacromial decompression 23.     Over the past month pt has made the following progress towards goals:  1) Decreased pain  2) Improved range of  motion  3) Improved strength  4) Improved functional use of arm.    I was prepared/planning to discharge Tanya today, but did have a flare of L shoulder pain localized around A-C joint since last visit. This is improving but not fully resolved.     Everything looks good on exam. I would like to see him in two weeks for reassessment to make sure he is back to 100%.       Understanding of Dx/Px/POC: excellent  Goals  ST : Decrease L shoulder pain to 2/10 at worst x 1 continuous week within 6 weeks-not yet met         PROM: Improve L shoulder PROM to 120 degrees for flexion/scaption, ER to 45 degrees, IR to 45 degrees within 6 weeks.-met         AROM Improve L shoulder AROM flexion to 90 degrees in 6-8 weeks.- met         Pt to begin submaximal shoulder isometrics by 6 weeks post op.-met         Improved FOTO self rated functional scale score (25@ IE), pt to note greater ease with dressing, return to driving within 6 weeks.-met     LT : Eliminate L shoulder pain x 1 continuous week within 12 weeks.-not yet met         PROM: Improve L shoulder PROM to 170 degrees for flexion/scaption, ER to 90 degrees, IR to 75degrees within 12 weeks.-met         AROM :  Improve L shoulder AROM to 170 degrees for flexion/scaption, ER to 90 degrees, IR to T7 level within 16 weeks. - met         Strength: 5/5 L shoulder stength within 16 weeks -20 weeks.- met         Function: FOTO score to be at least 67 within 16 weeks.- met         Independent with home exercise program within 16-20 weeks.-not yet met        Plan  Reviewed HEP and provided updated handout. He will continue with these and follow up in two weeks .Advised him to contact me should any questions or concerns arise before that time.     Subjective:   Was moving a basketball hoop about a week and half ago when he felt jolt on pain in L shoulder. Points to area of L A-C joint as worst are of pain. This is improving, but has not fully resolved.      Objective: See treatment  diary below.     L shoulder:       AROM  Normal Value PROM  Normal Value Strength Normal Value   Shoulder Flexion 175 180 175 180 5 5   Shoulder Abduction 175 180 175 180 5 5   Shoulder External Rotation 90 90 90 90 5 5   Shoulder Internal Rotation T7 T7 75 70 5 5                                     Strength             Rhomboids 5 5           Lower Trapezius 5 5           Serratus Anterior 5 5                   Precautions: Follow rotator cuff protocol attached to chart.    DOS: 11/13/2023    Manuals 3/18/24 4/11                                                  PROM L shoulder   (as tolerated) RG RG           Manual posterior capsule stretch RG RG                                     Neuro Re-Ed                                                                                                        Ther Ex             UBE UBE2  3/3  L3.5 UBE2  3/3  L3.5           Pulley flexion 10sec hold x 4 min 10sec xhold x 4 min           Sleeper IR 30sec x 3 30sec x 3                                     Elevation in scaption 3#  2 x 10 3#  2 x 10           ABD to 90 in standing 2#  2 x 15 3#  2 x 10           SL ER 3#  2 x 10 3#  2 x 15           Tband IR Blue  2  x15 D/C           Prone middle traps 2#  2 x 10 2#  2 x 15           Prone lower trap 2#  2 x 10 2#  2 x 15           Ball circles at wall 20cw/ccw   x 3 1#  20 cw/ccw x 3           Tband ER Green  2  x 15 Blue  2x  15           BOSU Wall pushups 2 x 15 2 x 15           Supine triceps press 10#  2 x 10 10#  2 x 15           Supine chest press 10#  2 x 10 10#  2 x 15           Med ball up and out 5# DB  2 x 10 5#  2 x 10           Strap IR  10sec x 5           AROM IR 10sec x 10 10sec x 10           Tband PNF D2 flexion Orange  2 x 10 Orange  2 x 15                        Ther Activity                                       Gait Training                                       Modalities

## 2024-04-11 NOTE — PROGRESS NOTES
Progress Note     Today's date: 2024  Patient name: Tanya Padgett  : 1975  MRN: [id number]  Referring provider: Bogdan Neville*  Dx:   Encounter Diagnosis     ICD-10-CM    1. Traumatic complete tear of left rotator cuff, subsequent encounter  S46.012D       2. Rupture of left long head biceps tendon, subsequent encounter  S46.112D       3. S/P left rotator cuff repair  Z98.890              Stop Time: 1730            Assessment   Patient is a 49 y.o. male s/p (L)  rotator cuff repair, biceps tenodesis,  distal clavicle excision and subacromial decompression 23.     Over the past month pt has made the following progress towards goals:  1) Decreased pain  2) Improved range of motion  3) Improved strength  4) Improved functional use of arm.    I was prepared/planning to discharge Tanya today, but did have a flare of L shoulder pain localized around A-C joint since last visit. This is improving but not fully resolved.     Everything looks good on exam. I would like to see him in two weeks for reassessment to make sure he is back to 100%.       Understanding of Dx/Px/POC: excellent  Goals  ST : Decrease L shoulder pain to 2/10 at worst x 1 continuous week within 6 weeks-not yet met         PROM: Improve L shoulder PROM to 120 degrees for flexion/scaption, ER to 45 degrees, IR to 45 degrees within 6 weeks.-met         AROM Improve L shoulder AROM flexion to 90 degrees in 6-8 weeks.- met         Pt to begin submaximal shoulder isometrics by 6 weeks post op.-met         Improved FOTO self rated functional scale score (25@ IE), pt to note greater ease with dressing, return to driving within 6 weeks.-met     LT : Eliminate L shoulder pain x 1 continuous week within 12 weeks.-not yet met         PROM: Improve L shoulder PROM to 170 degrees for flexion/scaption, ER to 90 degrees, IR to 75degrees within 12 weeks.-met         AROM :  Improve L shoulder AROM to 170 degrees for flexion/scaption, ER to  90 degrees, IR to T7 level within 16 weeks. - met         Strength: 5/5 L shoulder stength within 16 weeks -20 weeks.- met         Function: FOTO score to be at least 67 within 16 weeks.- met         Independent with home exercise program within 16-20 weeks.-not yet met        Plan  Reviewed HEP and provided updated handout. He will continue with these and follow up in two weeks .Advised him to contact me should any questions or concerns arise before that time.     Subjective:   Was moving a basketball hoop about a week and half ago when he felt jolt on pain in L shoulder. Points to area of L A-C joint as worst are of pain. This is improving, but has not fully resolved.      Objective: See treatment diary below.     L shoulder:       AROM  Normal Value PROM  Normal Value Strength Normal Value   Shoulder Flexion 175 180 175 180 5 5   Shoulder Abduction 175 180 175 180 5 5   Shoulder External Rotation 90 90 90 90 5 5   Shoulder Internal Rotation T7 T7 75 70 5 5                                     Strength             Rhomboids 5 5           Lower Trapezius 5 5           Serratus Anterior 5 5                   Precautions: Follow rotator cuff protocol attached to chart.    DOS: 11/13/2023    Manuals 3/18/24 4/11                                                  PROM L shoulder   (as tolerated) RG RG           Manual posterior capsule stretch RG RG                                     Neuro Re-Ed                                                                                                        Ther Ex             UBE UBE2  3/3  L3.5 UBE2  3/3  L3.5           Pulley flexion 10sec hold x 4 min 10sec xhold x 4 min           Sleeper IR 30sec x 3 30sec x 3                                     Elevation in scaption 3#  2 x 10 3#  2 x 10           ABD to 90 in standing 2#  2 x 15 3#  2 x 10           SL ER 3#  2 x 10 3#  2 x 15           Tband IR Blue  2  x15 D/C           Prone middle traps 2#  2 x 10 2#  2 x 15            Prone lower trap 2#  2 x 10 2#  2 x 15           Ball circles at wall 20cw/ccw   x 3 1#  20 cw/ccw x 3           Tband ER Green  2  x 15 Blue  2x  15           BOSU Wall pushups 2 x 15 2 x 15           Supine triceps press 10#  2 x 10 10#  2 x 15           Supine chest press 10#  2 x 10 10#  2 x 15           Med ball up and out 5# DB  2 x 10 5#  2 x 10           Strap IR  10sec x 5           AROM IR 10sec x 10 10sec x 10           Tband PNF D2 flexion Orange  2 x 10 Orange  2 x 15                        Ther Activity                                       Gait Training                                       Modalities

## 2024-04-25 ENCOUNTER — OFFICE VISIT (OUTPATIENT)
Dept: PHYSICAL THERAPY | Facility: CLINIC | Age: 49
End: 2024-04-25
Payer: COMMERCIAL

## 2024-04-25 DIAGNOSIS — S46.112D RUPTURE OF LEFT LONG HEAD BICEPS TENDON, SUBSEQUENT ENCOUNTER: ICD-10-CM

## 2024-04-25 DIAGNOSIS — Z98.890 S/P LEFT ROTATOR CUFF REPAIR: ICD-10-CM

## 2024-04-25 DIAGNOSIS — S46.012D TRAUMATIC COMPLETE TEAR OF LEFT ROTATOR CUFF, SUBSEQUENT ENCOUNTER: Primary | ICD-10-CM

## 2024-04-25 PROCEDURE — 97140 MANUAL THERAPY 1/> REGIONS: CPT | Performed by: PHYSICAL THERAPIST

## 2024-04-25 PROCEDURE — 97110 THERAPEUTIC EXERCISES: CPT | Performed by: PHYSICAL THERAPIST

## 2024-04-25 NOTE — PROGRESS NOTES
Progress Note     Today's date: 2024  Patient name: Tanya Padgett  : 1975  MRN: [id number]  Referring provider: Bogdan Neville*  Dx:   Encounter Diagnosis     ICD-10-CM    1. Traumatic complete tear of left rotator cuff, subsequent encounter  S46.012D       2. Rupture of left long head biceps tendon, subsequent encounter  S46.112D       3. S/P left rotator cuff repair  Z98.890             Start Time: 1640  Stop Time: 1738  Total time in clinic (min): 58 minutes         Assessment   Patient is a 49 y.o. male s/p (L)  rotator cuff repair, biceps tenodesis,  distal clavicle excision and subacromial decompression 23.     Over the past month pt has made the following progress towards goals:  1) Decreased pain  2) Improved range of motion  3) Improved strength  4) Improved functional use of arm.  Flaree of A-C pain reported last visit has since resolved.  At this time Tanya is functioning well, has an excellent objective exam, a good HEP, and is ready for discharge.     Understanding of Dx/Px/POC: excellent  Goals  ST : Decrease L shoulder pain to 2/10 at worst x 1 continuous week within 6 weeks- met         PROM: Improve L shoulder PROM to 120 degrees for flexion/scaption, ER to 45 degrees, IR to 45 degrees within 6 weeks.-met         AROM Improve L shoulder AROM flexion to 90 degrees in 6-8 weeks.- met         Pt to begin submaximal shoulder isometrics by 6 weeks post op.-met         Improved FOTO self rated functional scale score (25@ IE), pt to note greater ease with dressing, return to driving within 6 weeks.-met     LT : Eliminate L shoulder pain x 1 continuous week within 12 weeks.-met         PROM: Improve L shoulder PROM to 170 degrees for flexion/scaption, ER to 90 degrees, IR to 75degrees within 12 weeks.-met         AROM :  Improve L shoulder AROM to 170 degrees for flexion/scaption, ER to 90 degrees, IR to T7 level within 16 weeks. - met         Strength: 5/5 L shoulder stength  within 16 weeks -20 weeks.- met         Function: FOTO score to be at least 67 within 16 weeks.- met         Independent with home exercise program within 16-20 weeks.-met        Plan  Advised patient to continue with home exercise program . Advised patient to contact me with any future questions or concerns regarding exercise. Pt is in agreement with discharge plan.       Subjective:   L A-C joint pain reported last visit has since resolved. Feeling good. No specific difficulty with ADLs.      Objective: See treatment diary below.     L shoulder:       AROM  Normal Value PROM  Normal Value Strength Normal Value   Shoulder Flexion 175 180 175 180 5 5   Shoulder Abduction 175 180 175 180 5 5   Shoulder External Rotation 90 90 90 90 5 5   Shoulder Internal Rotation T7 T7 75 70 5 5                                     Strength             Rhomboids 5 5           Lower Trapezius 5 5           Serratus Anterior 5 5                   Precautions: Follow rotator cuff protocol attached to chart.    DOS: 11/13/2023    Manuals 3/18/24 4/11 4/25                                                 PROM L shoulder   (as tolerated) RG RG RG          Manual posterior capsule stretch RG RG RG                                    Neuro Re-Ed                                                                                                        Ther Ex             UBE UBE2  3/3  L3.5 UBE2  3/3  L3.5 UBE2  3/3  L35          Pulley flexion 10sec hold x 4 min 10sec xhold x 4 min 10sec hold  x   4min          Sleeper IR 30sec x 3 30sec x 3 30sec x 3                                    Elevation in scaption 3#  2 x 10 3#  2 x 10 3#  2 x 15          ABD to 90 in standing 2#  2 x 15 3#  2 x 10 3#  2 x 15          SL ER 3#  2 x 10 3#  2 x 15 3#  2 x15          Tband IR Blue  2  x15 D/C           Prone middle traps 2#  2 x 10 2#  2 x 15 2#  2 x 15          Prone lower trap 2#  2 x 10 2#  2 x 15 2#  2 x 15          Ball circles at wall 20cw/ccw   x 3  1#  20 cw/ccw x 3 1#  20 cw/ccw/  3          Tband ER Green  2  x 15 Blue  2x  15 Purple  2 x 15          BOSU Wall pushups 2 x 15 2 x 15 2 x 20          Supine triceps press 10#  2 x 10 10#  2 x 15 10#  2 x 15          Supine chest press 10#  2 x 10 10#  2 x 15 10#  2 x 15          Med ball up and out 5# DB  2 x 10 5#  2 x 10 5#  2 x 15          Strap IR  10sec x 5           AROM IR 10sec x 10 10sec x 10           Tband PNF D2 flexion Orange  2 x 10 Orange  2 x 15 Orange  2 x 15                       Ther Activity                                       Gait Training                                       Modalities